# Patient Record
Sex: FEMALE | Race: WHITE | NOT HISPANIC OR LATINO | Employment: FULL TIME | URBAN - METROPOLITAN AREA
[De-identification: names, ages, dates, MRNs, and addresses within clinical notes are randomized per-mention and may not be internally consistent; named-entity substitution may affect disease eponyms.]

---

## 2017-03-31 ENCOUNTER — GENERIC CONVERSION - ENCOUNTER (OUTPATIENT)
Dept: OTHER | Facility: OTHER | Age: 52
End: 2017-03-31

## 2017-03-31 ENCOUNTER — HOSPITAL ENCOUNTER (OUTPATIENT)
Dept: BONE DENSITY | Facility: IMAGING CENTER | Age: 52
Discharge: HOME | End: 2017-03-31
Payer: COMMERCIAL

## 2017-03-31 DIAGNOSIS — Z12.31 ENCOUNTER FOR SCREENING MAMMOGRAM FOR MALIGNANT NEOPLASM OF BREAST: ICD-10-CM

## 2017-03-31 PROCEDURE — G0202 SCR MAMMO BI INCL CAD: HCPCS

## 2017-12-12 ENCOUNTER — ALLSCRIPTS OFFICE VISIT (OUTPATIENT)
Dept: OTHER | Facility: OTHER | Age: 52
End: 2017-12-12

## 2017-12-12 DIAGNOSIS — Z12.31 ENCOUNTER FOR SCREENING MAMMOGRAM FOR MALIGNANT NEOPLASM OF BREAST: ICD-10-CM

## 2017-12-13 NOTE — PROGRESS NOTES
Assessment    1  Encounter for gynecological examination with abnormal finding (V72 31) (Z01 411)   2  Visit for screening mammogram (V76 12) (Z12 31)   3  Dietary calcium deficiency (269 3) (E58)   4  Cervical cancer screening (V76 2) (Z12 4)   5  Colon cancer screening (V76 51) (Z12 11)   6  Enlarged uterus (621 2) (N85 2)   7  Inadequate exercise (V69 0) (Z72 3)    Plan  Cervical cancer screening    · (Q) THINPREP TIS PAP AND HPV mRNA E6/E7 REFLEX HPV 16,18/45; Status:Active; Requested for:69Jhr2142;    Perform:[facility]; Order Comments:53 yo G0  LMP 12/4/17  no hx abnl papcvx/endocvx; Due:52Vxc4200; Ordered; For:Cervical cancer screening; Ordered By:Erma Ernst;  Colon cancer screening    · (Q) FECAL GLOBIN BY IMMUNOCHEMISTRY; Status:Active; Requested for:51Rmx0591;    Perform:[facility]; Due:06Occ4172; Ordered;cancer screening; Ordered By:Julius Ernst;  Encounter for gynecological examination with abnormal finding    · Follow-up visit in 1 year Evaluation and Treatment  Follow-up  Status: Hold For -Scheduling  Requested for: 53BLB8285   Ordered;Encounter for gynecological examination with abnormal finding; Ordered By: Narendra Haley Performed:  Due: 76YWD5576   · Begin or continue regular aerobic exercise   Gradually work up to at least {count1}sessions of {dur1} of exercise a week ; Status:Complete;   Done: 68HTI7852 04:02PM   Ordered;For:Encounter for gynecological examination with abnormal finding; Ordered By:Julius Ernst;   · Drink plenty of fluids ; Status:Complete;   Done: 86SOT9335 04:02PM   Ordered;For:Encounter for gynecological examination with abnormal finding; Ordered By:Julius Ernst;   · Eat a low fat and low cholesterol diet ; Status:Complete;   Done: 13NUL2262 04:02PM   Ordered;For:Encounter for gynecological examination with abnormal finding; Ordered By:Julius Ernst;   · Eat a normal well-balanced diet ; Status:Complete;   Done: 33TSJ9188 04:02PM   Ordered;For:Encounter for gynecological examination with abnormal finding; Ordered By:Dexter Ernst;   · Eat foods that are high in calcium ; Status:Complete;   Done: 44FUN3783 04:02PM   Ordered;For:Encounter for gynecological examination with abnormal finding; Ordered By:Dexter Ernst;   · How to prevent vaginal infections ; Status:Complete;   Done: 80UAV4639 04:02PM   Ordered;for gynecological examination with abnormal finding; Ordered By:Erma Ernst;   · Many types of infections can be passed person to person  To prevent this you need to beisolated for 7 days ; Status:Complete;   Done: 10BUO8060 04:02PM   Ordered;for gynecological examination with abnormal finding; Ordered By:Dexter Ernst;   · Regular aerobic exercise can help reduce stress ; Status:Complete;   Done: 28EVQ312215:79VX   Ordered;for gynecological examination with abnormal finding; Ordered By:Erma Ernst;   · Stretch and warm up your muscles during the first 10 minutes , then cool down yourmuscles for the last 10 minutes of exercise ; Status:Complete;   Done: 40ATW541985:73EJ   Ordered;for gynecological examination with abnormal finding; Ordered By:Dexter Ernst;   · These are things you can do to prevent falls in and around the home ; Status:Complete;  Done: 81AKN2307 04:02PM   Ordered;for gynecological examination with abnormal finding; Ordered By:Erma Ernst;   · Use a sun block product with an SPF of 15 or more ; Status:Complete;   Done:12Rqy4919 04:02PM   Ordered;for gynecological examination with abnormal finding; Ordered By:Erma Ernst;   · We encourage all of our patients to exercise regularly  30 minutes of exercise or physicalactivity five or more days a week is recommended for children and adults  ;Status:Complete;   Done: 69USU6050 95:67FN   Ordered;for gynecological examination with abnormal finding; Ordered By:Erma Ernst;   · We recommend that you change your eating habits slowly ; Status:Complete; Done:12Dec2017 04:02PM   Ordered;for gynecological examination with abnormal finding; Ordered By:Manny Ernst;   · We recommend that you create an advance directive ; Status:Complete;   Done:12Dec2017 04:02PM   Ordered;for gynecological examination with abnormal finding; Ordered By:Erma Ernst;   · We recommend that you follow these steps to lower your risk of osteoporosis  ;Status:Complete;   Done: 85CJK4384 21:51CM   Ordered;for gynecological examination with abnormal finding; Ordered By:Erma Ernst;   · We recommend you modify your diet to achieve and maintain a healthy weight  Patricia Jeremy may increase your risk for developing health problems such as diabetes,heart disease, and cancer  Avoid high fat foods and eat a balanced diet richin fruits and vegetables  The combination of a reduced-calorie diet and increasedphysical activity is recommended  Please let us know if you would like tolearn more about your nutrition and calorie needs, and additional options includingweight loss programs that can help you achieve your goals ; Status:Complete;   Done:12Dec2017 04:02PM   Ordered;for gynecological examination with abnormal finding; Ordered By:Manny Ernst;  Visit for screening mammogram    · MAMMO SCREENING BILATERAL W 3D & CAD; Status:Hold For - Scheduling; Requestedfor:12Dec2017;    Perform:[facility]; Order Comments:51 yo G0 c LMP 12/4/17 needs screening; Due:12Dec2018; Ordered;for screening mammogram; Ordered By:Erma Ernst;    Discussion/Summary  Currently, she eats an adequate diet, has an inadequate exercise regimen and enc increased frequency  Pap test was done today Breast cancer screening: mammogram has been ordered  Colorectal cancer screening: fecal occult blood testing supplies were given, the next colonoscopy is due 2017 and 2014  Osteoporosis screening: bone mineral density testing is not indicated   Advice and education were given regarding aerobic exercise, weight bearing exercise, calcium supplements and reproductive health  Patient discussion: discussed with the patient  As above  The patient has the current Goals: Prevention  The patent has the current Barriers: 0  Patient is able to Self-Care  History of Present Illness  HPI: Pt is a 47 yo G0 c LMP 17 using nothing for [facility] presents for preventive care  same x 17 yrs, lifetime1-2x/dywalks 2x/wkmonthly [id number], due ? now      Review of Systems  no nonmenstrual bleeding,-- no dysuria-- and-- no bladder pain  OB History  Pregnancy History (Brief):  Prior pregnancies: : 0  Para: Active Problems  1  Acne (706 1) (L70 9)   2  Adenomyosis (617 0) (N80 0)   3  Colon cancer screening (V76 51) (Z12 11)   4  Cough (786 2) (R05)   5  Dietary calcium deficiency (269 3) (E58)   6  Encounter for gynecological examination with abnormal finding (V72 31) (Z01 411)   7  Hemorrhoids (455 6) (K64 9)   8  Premenstrual tension syndrome (625 4) (N94 3)   9  Urinary tract infection (599 0) (N39 0)   10  Visit for screening mammogram (V76 12) (Z12 31)    Past Medical History   · History of Chlamydial cervicitis (079 88) (A56 09)   · History of Dermoid cyst of right ovary (220) (D27 0)   · History of Fibroids, intramural (218 1) (D25 1)   · Denied: History of abnormal cervical Pap smear   · History of Hashimoto thyroiditis (V12 29) (Z86 39)   · Denied: History of pregnancy    The active problems and past medical history were reviewed and updated today  Surgical History   · History of Complete Colonoscopy   · History of Hysteroscopy   · History of Oral Surgery Tooth Extraction   · History of Removal Of Lesion   · History of Salpingo-oophorectomy Right Side    The surgical history was reviewed and updated today         Family History  Mother    · Family history of hypercholesterolemia (V18 19) (Z83 42)   · Family history of hypertension (V17 49) (Z82 49)   · Family history of skin cancer (V16 8) (Z80 8)   · Family history of type 2 diabetes mellitus (V18 0) (Z83 3)  Father    · Family history of hypertension (V17 49) (Z82 49)   · Family history of lung cancer (V16 1) (Z80 1)   · Family history of Glaucoma  Sister    · Family history of hypercholesterolemia (V18 19) (Z83 42)   · Family history of hypertension (V17 49) (Z82 49)  Brother    · Family history of lung cancer (V16 1) (Z80 1)  Paternal Aunt    · Family history of breast cancer (V16 3) (Z80 3)   · Family history of leukemia (V16 6) (Z80 6)  Maternal Uncle    · Family history of aneurysm (V19 8) (Z82 49)    The family history was reviewed and updated today  Social History     · Alcohol use (V49 89) (Z78 9)   · 2-4x/month   · Denied: History of Drug use   · Inadequate exercise (V69 0) (Z72 3)   ·    · Never A Smoker   · Occupation   · dental hygienist  The social history was reviewed and updated today  Current Meds   1  Calcium + D TABS; Therapy: (Recorded:25Rbk7609) to Recorded    Allergies  1  Latex Exam Gloves MISC   2  Codeine Derivatives   3  Zithromax TABS  4  Latex    Vitals   Recorded: 27OTQ5822 58:25MF   Systolic 033   Diastolic 68   Height 5 ft 6 in   Weight 168 lb    BMI Calculated 27 12   BSA Calculated 1 86   LMP 73VGW5560       Physical Exam   Constitutional  General appearance: No acute distress, well appearing and well nourished  Neck  Neck: Normal, supple, trachea midline, no masses  Thyroid: Normal, no thyromegaly  Pulmonary  Respiratory effort: No increased work of breathing or signs of respiratory distress  Genitourinary  External genitalia: Normal and no lesions appreciated  Vagina: Normal, no lesions or dryness appreciated  Urethral meatus: Normal    Bladder: Normal, soft, non-tender and no prolapse or masses appreciated  Cervix: Normal, no palpable masses  -- nullip s lesions  Uterus: Abnormal  -- mid plane, 3 cm ant myoma, 4 cm post fundal myoma  10 wks size    Adnexa/parametria: Normal, non-tender and no fullness or masses appreciated  -- no masses or tenderness  Anus, perineum, and rectum: Normal sphincter tone, no masses, and no prolapse  No masses or nodularity  pelvic brim clear  Chest  Breasts: Normal and no dimpling or skin changes noted  Abdomen  Abdomen: Normal, non-tender, and no organomegaly noted  Liver and spleen: No hepatomegaly or splenomegaly  Examination for hernias: No hernias appreciated  Lymphatic  Palpation of lymph nodes in neck, axillae, groin and/or other locations: No lymphadenopathy or masses noted  Skin  Skin and subcutaneous tissue: Normal skin turgor and no rashes     Psychiatric  Orientation to person, place, and time: Normal    Mood and affect: Normal        Signatures   Electronically signed by : GIOVANNI Green ; Dec 12 2017  4:03PM EST                       (Author)

## 2017-12-14 LAB
ADDITIONAL INFORMATION (HISTORICAL): NORMAL
ADEQUACY: (HISTORICAL): NORMAL
COMMENT (HISTORICAL): NORMAL
CYTOTECHNOLOGIST: (HISTORICAL): NORMAL
HPV MRNA E6/E7 (HISTORICAL): NOT DETECTED
INTERPRETATION (HISTORICAL): NORMAL
LMP (HISTORICAL): NORMAL
PREV. BX: (HISTORICAL): NORMAL
PREV. PAP (HISTORICAL): NORMAL
SOURCE (HISTORICAL): NORMAL

## 2017-12-15 ENCOUNTER — GENERIC CONVERSION - ENCOUNTER (OUTPATIENT)
Dept: OTHER | Facility: OTHER | Age: 52
End: 2017-12-15

## 2018-01-13 NOTE — RESULT NOTES
Verified Results  * MAMMO SCREENING BILATERAL W CAD 18SKC0792 07:44AM Larissa Stallings Order Number: [id number]   Performing Comments: 45 yo G0 needs screening  Paternal aunt had Ca  at 80 yo   - Patient Instructions: To schedule this appointment, please contact Central Scheduling at [phone]  Do not wear any perfume, powder, lotion or deodorant on breast or    underarm area  Please bring your doctors order, referral (if needed) and insurance information with you on the day of the test  Failure to bring this information may result in this test being rescheduled  Arrive 15 minutes prior to your appointment time to register  On the day of your test, please bring any prior mammogram or breast studies with you that were not performed at a [facility]  Failure to bring prior exams may result in your test needing to be rescheduled  Test Name Result Flag Reference   MAMMO SCREENING BILATERAL W CAD (Report)     Patient History:   Patient is nulliparous  Family history of breast cancer at age 79 in paternal aunt  Took hormonal contraceptives for 12 years beginning at age 12  Patient has never smoked  Patient's BMI is 26 8  Reason for exam: screening, asymptomatic  Mammo Screening Bilateral W CAD: March 31, 2017 - Check In #:    [de-identified]   Bilateral MLO, CC, and XCCL view(s) were taken  Technologist: MIKE Beyer (R)(M)   Prior study comparison: June 30, 2015, bilateral WB digitl bilat    osorio, performed at [address] [place] [address]  November 16, 2009, bilateral mammogram  October 31, 2007, bilateral    mammogram      There are scattered fibroglandular densities  No dominant soft tissue mass, architectural distortion or    suspicious calcifications are noted  The skin and nipple    contours are within normal limits  No evidence of malignancy  No significant changes   when compared with prior studies       ACR BI-RADSï¾® Assessments: BiRad:1 - Negative     Recommendation:   Routine screening mammogram of both breasts in 1 year  A    reminder letter will be sent  Analyzed by CAD     8-10% of cancers will be missed on mammography  Management of a    palpable abnormality must be based on clinical grounds  Patients   will be notified of their results via letter from our facility  Accredited by [employer] of Radiology and FDA  Transcription Location: MIKE Garner 98: [id number]     Risk Value(s):   Tyrer-Cuzick 10 Year: 4 200%, Tyrer-Cuzick Lifetime: 15 900%,    Myriad Table: 1 5%, IRIS 5 Year: 1 2%, NCI Lifetime: 9 6%   Signed by:    Thuy Ruelas MD   3/31/17

## 2018-01-22 VITALS
HEIGHT: 66 IN | SYSTOLIC BLOOD PRESSURE: 104 MMHG | DIASTOLIC BLOOD PRESSURE: 68 MMHG | BODY MASS INDEX: 27 KG/M2 | WEIGHT: 168 LBS

## 2018-01-23 NOTE — RESULT NOTES
Verified Results  (Q) THINPREP TIS PAP AND HPV mRNA E6/E7 REFLEX HPV 16,18/45 [id number] 12:00AM Wade Saunders     Test Name Result Flag Reference   CLINICAL INFORMATION: G0     LMP: 730790     PREV  PAP: NO HX ABNL     PREV  BX: NONE GIVEN     SOURCE: Cervix, Endocervix     STATEMENT OF ADEQUACY:      Satisfactory for evaluation  Endocervical/transformation zone component  present  INTERPRETATION/RESULT:      Negative for intraepithelial lesion or malignancy  COMMENT:      This Pap test has been evaluated with computer  assisted technology  CYTOTECHNOLOGIST:      ALFREDO RICHARDSON(ASCP)  CT screening location: [address] [place] [address]   HPV mRNA E6/E7 Not Detected  Not Detected   This test was performed using the APTIMA HPV Assay ([employer] )  This assay detects E6/E7 viral messenger RNA (mRNA) from 14  high-risk HPV types (16,18,31,33,35,39,45,51,52,56,58,59,66,68)

## 2019-02-22 LAB
ALBUMIN SERPL-MCNC: 3.9 G/DL (ref 3.6–5.1)
ALBUMIN/GLOB SERPL: 1.6 (CALC) (ref 1–2.5)
ALP SERPL-CCNC: 51 U/L (ref 33–130)
ALT SERPL-CCNC: 9 U/L (ref 6–29)
AST SERPL-CCNC: 15 U/L (ref 10–35)
BILIRUB SERPL-MCNC: 0.5 MG/DL (ref 0.2–1.2)
BUN SERPL-MCNC: 16 MG/DL (ref 7–25)
BUN/CREAT SERPL: NORMAL (CALC) (ref 6–22)
CALCIUM SERPL-MCNC: 9.1 MG/DL (ref 8.6–10.4)
CHLORIDE SERPL-SCNC: 106 MMOL/L (ref 98–110)
CO2 SERPL-SCNC: 26 MMOL/L (ref 20–32)
CREAT SERPL-MCNC: 0.76 MG/DL (ref 0.5–1.05)
GLOBULIN SER CALC-MCNC: 2.4 G/DL (CALC) (ref 1.9–3.7)
GLUCOSE SERPL-MCNC: 90 MG/DL (ref 65–139)
POTASSIUM SERPL-SCNC: 3.9 MMOL/L (ref 3.5–5.3)
PROT SERPL-MCNC: 6.3 G/DL (ref 6.1–8.1)
SL AMB EGFR AFRICAN AMERICAN: 104 ML/MIN/1.73M2
SL AMB EGFR NON AFRICAN AMERICAN: 90 ML/MIN/1.73M2
SODIUM SERPL-SCNC: 138 MMOL/L (ref 135–146)
T4 FREE SERPL-MCNC: 1 NG/DL (ref 0.8–1.8)
TSH SERPL-ACNC: 5.66 MIU/L

## 2020-10-07 ENCOUNTER — TRANSCRIBE ORDERS (OUTPATIENT)
Dept: ADMINISTRATIVE | Facility: HOSPITAL | Age: 55
End: 2020-10-07

## 2020-10-07 DIAGNOSIS — Z12.31 ENCOUNTER FOR SCREENING MAMMOGRAM FOR MALIGNANT NEOPLASM OF BREAST: Primary | ICD-10-CM

## 2020-11-20 ENCOUNTER — TELEPHONE (OUTPATIENT)
Dept: OBGYN CLINIC | Facility: CLINIC | Age: 55
End: 2020-11-20

## 2020-12-17 ENCOUNTER — HOSPITAL ENCOUNTER (OUTPATIENT)
Dept: BONE DENSITY | Facility: IMAGING CENTER | Age: 55
Discharge: HOME | End: 2020-12-17
Payer: COMMERCIAL

## 2020-12-17 VITALS — BODY MASS INDEX: 29.99 KG/M2 | HEIGHT: 65 IN | WEIGHT: 180 LBS

## 2020-12-17 DIAGNOSIS — Z12.31 ENCOUNTER FOR SCREENING MAMMOGRAM FOR MALIGNANT NEOPLASM OF BREAST: ICD-10-CM

## 2020-12-17 PROCEDURE — 77067 SCR MAMMO BI INCL CAD: CPT

## 2020-12-17 PROCEDURE — 77063 BREAST TOMOSYNTHESIS BI: CPT

## 2021-12-20 ENCOUNTER — HOSPITAL ENCOUNTER (OUTPATIENT)
Dept: MAMMOGRAPHY | Facility: MEDICAL CENTER | Age: 56
Discharge: HOME | End: 2021-12-20
Payer: COMMERCIAL

## 2021-12-20 VITALS — BODY MASS INDEX: 29.97 KG/M2 | HEIGHT: 65 IN | WEIGHT: 179.9 LBS

## 2021-12-20 DIAGNOSIS — Z12.31 ENCOUNTER FOR SCREENING MAMMOGRAM FOR MALIGNANT NEOPLASM OF BREAST: ICD-10-CM

## 2021-12-20 PROCEDURE — 77063 BREAST TOMOSYNTHESIS BI: CPT

## 2021-12-20 PROCEDURE — 77067 SCR MAMMO BI INCL CAD: CPT

## 2022-01-21 ENCOUNTER — HOSPITAL ENCOUNTER (OUTPATIENT)
Dept: MAMMOGRAPHY | Facility: CLINIC | Age: 57
Discharge: HOME | End: 2022-01-21
Payer: COMMERCIAL

## 2022-01-21 ENCOUNTER — HOSPITAL ENCOUNTER (OUTPATIENT)
Dept: ULTRASOUND IMAGING | Facility: CLINIC | Age: 57
Discharge: HOME | End: 2022-01-21
Payer: COMMERCIAL

## 2022-01-21 VITALS — WEIGHT: 179 LBS | BODY MASS INDEX: 29.82 KG/M2 | HEIGHT: 65 IN

## 2022-01-21 DIAGNOSIS — R92.8 ABNORMAL SCREENING MAMMOGRAM: ICD-10-CM

## 2022-01-21 PROCEDURE — 76642 ULTRASOUND BREAST LIMITED: CPT

## 2022-01-21 PROCEDURE — G0279 TOMOSYNTHESIS, MAMMO: HCPCS

## 2022-01-21 PROCEDURE — 77065 DX MAMMO INCL CAD UNI: CPT

## 2022-06-24 ENCOUNTER — ANESTHESIA (OUTPATIENT)
Dept: GASTROENTEROLOGY | Facility: HOSPITAL | Age: 57
End: 2022-06-24

## 2022-06-24 ENCOUNTER — HOSPITAL ENCOUNTER (OUTPATIENT)
Dept: GASTROENTEROLOGY | Facility: HOSPITAL | Age: 57
Setting detail: OUTPATIENT SURGERY
Discharge: HOME | End: 2022-06-24
Attending: COLON & RECTAL SURGERY | Admitting: COLON & RECTAL SURGERY
Payer: COMMERCIAL

## 2022-06-24 ENCOUNTER — ANESTHESIA EVENT (OUTPATIENT)
Dept: GASTROENTEROLOGY | Facility: HOSPITAL | Age: 57
End: 2022-06-24

## 2022-06-24 VITALS
RESPIRATION RATE: 18 BRPM | TEMPERATURE: 98.6 F | BODY MASS INDEX: 30.66 KG/M2 | HEART RATE: 54 BPM | WEIGHT: 184 LBS | DIASTOLIC BLOOD PRESSURE: 76 MMHG | SYSTOLIC BLOOD PRESSURE: 106 MMHG | HEIGHT: 65 IN | OXYGEN SATURATION: 97 %

## 2022-06-24 DIAGNOSIS — Z12.11 ENCOUNTER FOR SCREENING FOR MALIGNANT NEOPLASM OF COLON: ICD-10-CM

## 2022-06-24 DIAGNOSIS — Z80.0 FAMILY HISTORY OF MALIGNANT NEOPLASM OF DIGESTIVE ORGANS: ICD-10-CM

## 2022-06-24 DIAGNOSIS — Z86.010 PERSONAL HISTORY OF COLONIC POLYPS: ICD-10-CM

## 2022-06-24 PROBLEM — R11.2 PONV (POSTOPERATIVE NAUSEA AND VOMITING): Status: ACTIVE | Noted: 2022-06-24

## 2022-06-24 PROBLEM — Z98.890 PONV (POSTOPERATIVE NAUSEA AND VOMITING): Status: ACTIVE | Noted: 2022-06-24

## 2022-06-24 PROCEDURE — 88305 TISSUE EXAM BY PATHOLOGIST: CPT | Performed by: PATHOLOGY

## 2022-06-24 RX ORDER — SODIUM CHLORIDE 9 MG/ML
125 INJECTION, SOLUTION INTRAVENOUS CONTINUOUS
Status: DISCONTINUED | OUTPATIENT
Start: 2022-06-24 | End: 2022-06-28 | Stop reason: HOSPADM

## 2022-06-24 RX ORDER — PROPOFOL 10 MG/ML
INJECTION, EMULSION INTRAVENOUS CONTINUOUS PRN
Status: DISCONTINUED | OUTPATIENT
Start: 2022-06-24 | End: 2022-06-24

## 2022-06-24 RX ORDER — PROPOFOL 10 MG/ML
INJECTION, EMULSION INTRAVENOUS AS NEEDED
Status: DISCONTINUED | OUTPATIENT
Start: 2022-06-24 | End: 2022-06-24

## 2022-06-24 RX ADMIN — PROPOFOL 140 MCG/KG/MIN: 10 INJECTION, EMULSION INTRAVENOUS at 09:34

## 2022-06-24 RX ADMIN — PROPOFOL 50 MG: 10 INJECTION, EMULSION INTRAVENOUS at 09:39

## 2022-06-24 RX ADMIN — SODIUM CHLORIDE 125 ML/HR: 0.9 INJECTION, SOLUTION INTRAVENOUS at 08:30

## 2022-06-24 RX ADMIN — PROPOFOL 100 MG: 10 INJECTION, EMULSION INTRAVENOUS at 09:34

## 2022-06-24 NOTE — DISCHARGE INSTRUCTIONS
[facility] [address], [address] [place] [address] [address]  Phone: [phone]    DISCHARGE INSTRUCTIONS:    1   ___ Complete Exam - Normal    2   ___ Exam normal, but entire colon not seen  We will discuss this with you  3   ___ Polyp(s) removed by "burning" - NO pathology report will follow    4   _5__ Polyp(s) removed by excision  Pathology report will be available in 4-5 days   Someone from our office will call you with results  5   ___ Exam prompted biopsies  Pathology report will be available in 4-5 days   Someone from our office will call you with results  6   ___ Exam demonstrated findings that need treatment  Prescriptions will be   Given to you  Return to our office in ____ weeks  Please call for appt  7   ___ Original office visit or colonoscopy findings necessitate an office visit  Please call to set up a new appointment    8   ___ Medication  __________________________________________        [address]:    - Go straight home and rest today    - No driving or drinking alcohol for 24 hours    - Resume regular diet and medications unless otherwise instructed  Coumadin and Plavix are blood thinners  You can resume these medications on __________      IF YOU ARE HAVING ANY FEVER, BLEEDING OR PERSISTENT PAIN IN THE ABDOMEN, PLEASE CALL OUR OFFICE ANY DAY OR TIME  [phone]

## 2022-06-24 NOTE — ANESTHESIA PREPROCEDURE EVALUATION
Procedure:  COLONOSCOPY    Relevant Problems   ANESTHESIA   (+) PONV (postoperative nausea and vomiting)        Physical Exam    Airway    Mallampati score: II  TM Distance: >3 FB  Neck ROM: full     Dental   No notable dental hx     Cardiovascular  Cardiovascular exam normal    Pulmonary  Pulmonary exam normal     Other Findings        Anesthesia Plan  ASA Score- 2     Anesthesia Type- IV sedation with anesthesia with ASA Monitors  Additional Monitors:   Airway Plan:     Comment: No recent labs  Plan Factors-Exercise tolerance (METS): >4 METS  Chart reviewed  Patient is not a current smoker  Patient not instructed to abstain from smoking on day of procedure  Patient did not smoke on day of surgery  Induction- intravenous  Postoperative Plan-     Informed Consent- Anesthetic plan and risks discussed with patient  I personally reviewed this patient with the CRNA  Discussed and agreed on the Anesthesia Plan with the CRNA  Zain Delarosa

## 2022-06-24 NOTE — INTERVAL H&P NOTE
H&P reviewed  After examining the patient I find no changes in the patients condition since the H&P had been written      Vitals:    06/24/22 0833   BP: 122/77   Pulse: 61   Resp: 20   Temp: (!) 97 4 °F (36 3 °C)   SpO2: 98%

## 2022-06-24 NOTE — ANESTHESIA POSTPROCEDURE EVALUATION
Post-Op Assessment Note    CV Status:  Stable    Pain management: adequate     Mental Status:  Alert and awake   Hydration Status:  Euvolemic   PONV Controlled:  Controlled   Airway Patency:  Patent      Post Op Vitals Reviewed: Yes      Staff: CRNA         No complications documented      BP   106/76   Temp 98 6   Pulse  53   Resp  18   SpO2   97

## 2023-03-30 ENCOUNTER — HOSPITAL ENCOUNTER (OUTPATIENT)
Dept: RADIOLOGY | Facility: IMAGING CENTER | Age: 58
End: 2023-03-30

## 2023-03-30 VITALS — HEIGHT: 65 IN | BODY MASS INDEX: 31.65 KG/M2 | WEIGHT: 190 LBS

## 2023-03-30 DIAGNOSIS — Z12.31 ENCOUNTER FOR SCREENING MAMMOGRAM FOR MALIGNANT NEOPLASM OF BREAST: ICD-10-CM

## 2023-05-10 ENCOUNTER — HOSPITAL ENCOUNTER (OUTPATIENT)
Facility: MEDICAL CENTER | Age: 58
Discharge: HOME | End: 2023-05-10

## 2023-05-10 DIAGNOSIS — H93.12 LEFT-SIDED TINNITUS: ICD-10-CM

## 2023-05-10 DIAGNOSIS — H90.42 SENSORINEURAL HEARING LOSS (SNHL) OF LEFT EAR WITH UNRESTRICTED HEARING OF RIGHT EAR: ICD-10-CM

## 2023-05-12 ENCOUNTER — HOSPITAL ENCOUNTER (OUTPATIENT)
Dept: MRI IMAGING | Facility: HOSPITAL | Age: 58
Discharge: HOME | End: 2023-05-12

## 2023-05-12 RX ADMIN — GADOBUTROL 8 ML: 604.72 INJECTION INTRAVENOUS at 08:13

## 2023-08-07 ENCOUNTER — CONSULT (OUTPATIENT)
Dept: NEUROLOGY | Facility: CLINIC | Age: 58
End: 2023-08-07
Payer: COMMERCIAL

## 2023-08-07 VITALS
WEIGHT: 192 LBS | DIASTOLIC BLOOD PRESSURE: 91 MMHG | BODY MASS INDEX: 32.78 KG/M2 | HEIGHT: 64 IN | TEMPERATURE: 97.7 F | SYSTOLIC BLOOD PRESSURE: 138 MMHG | HEART RATE: 65 BPM

## 2023-08-07 DIAGNOSIS — R20.2 FACIAL TINGLING: ICD-10-CM

## 2023-08-07 DIAGNOSIS — G43.809 OTHER MIGRAINE WITHOUT STATUS MIGRAINOSUS, NOT INTRACTABLE: ICD-10-CM

## 2023-08-07 DIAGNOSIS — H93.12 TINNITUS OF LEFT EAR: ICD-10-CM

## 2023-08-07 DIAGNOSIS — G43.109 MIGRAINE WITH AURA AND WITHOUT STATUS MIGRAINOSUS, NOT INTRACTABLE: Primary | ICD-10-CM

## 2023-08-07 PROCEDURE — 99204 OFFICE O/P NEW MOD 45 MIN: CPT | Performed by: PHYSICIAN ASSISTANT

## 2023-08-07 NOTE — PROGRESS NOTES
Patient ID: Godfrey Major is a 62 y.o. female. Assessment/Plan:       Diagnoses and all orders for this visit:    Migraine with aura and without status migrainosus, not intractable  -     Comprehensive metabolic panel; Future  -     CBC and differential; Future  -     TSH, 3rd generation with Free T4 reflex; Future  -     Vitamin D 25 hydroxy; Future  -     HEMOGLOBIN A1C W/ EAG ESTIMATION; Future  -     Lipid panel; Future  -     Vitamin B12; Future  -     Lyme Total AB W Reflex to IGM/IGG; Future    Other migraine without status migrainosus, not intractable  -     Ambulatory referral to Neurology    Tinnitus of left ear    Facial tingling       The patient is here for worsening headaches associated with mild left facial tingling V1 and V2 region and around the eye, left sided tinnitus for which she was diagnosed with sensorineural hearing loss, not pulsatile tinnitus, no facial droop, no weakness of the extremities or other focal deficits. We discussed treatments and options for migraine headaches but first she would like to work up the condition. Brain MRI is normal thankfully. MRA as noted below and labs ordered today to rule out reversible causes. If nothing is discovered that is secondarily causing her symptoms, she may benefit from starting a low-dose tricyclic, or other supplements. Will opt to order MRA head to r/o aneurysm or other vascular malformation contributing to facial numbness, headaches and tinnitus; however higher suspicion tinnitus is 2/2 sensorineural hearing loss per audiology testing. Will move fwd with MRA anyway to rule it out. We discussed keeping a journal to identify any other triggers or patterns for her headaches were possible. We discussed food triggers, other supplements that she can take for prevention, lifestyle changes/modifications. The patient should not hesitate to call me prior to her follow up with any questions or concerns.         Subjective:    HPI Ms. Kristopher Stark is a pleasant 61 yo RH female here for neurological consultation for headaches and left sided tinnitus. She is a dental hygienist.    Started with ringing in her left ear, has flashing, floaters and specking in her left eye. Hit her eye many years ago and had been seen prev for that. Noticed after 2019 getting her covid shot she had a headache for 3 days, and since then has had headahces on her left side with tingling on her left side of her face and floaters. The patient has a history of a minor trauma to the corner of the right eye region, not the eye itself. She was in a dark room working in an office, an x-ray room which was dark and she lowered her face to look at something and hit the side of the left eye on the processor. She did not damage the orbit or cornea, but since that injury she reports floaters and occasional flashes in that eye. She saw [facility] eye when she was in her 45s and she was told she had ocular migraines. She states she initially went for pupil size changes during the pseudoephedrine treatment that she had for an allergy. She went on to have an MRA and MRI and both were unremarkable per her report. She reports a lot of headaches after her COVID-vaccine and then more so after she had COVID itself. She also reports menopause and stress are triggers for her headaches. COVID-vaccine in 2019, infection with COVID in 2022. She developed tinnitus in November 2022, and then developed a strange white light and a S shaped or squiggle shaped pattern in the left eye and more floaters which were more pronounced right before she saw her doctor for the tinnitus. She states the tinnitus would wake her up in the middle of the night, not pulsatile but more of a vibration and high-pitched sensation. Current headaches are associated with a funny tingling like sensation over the left frontal region, around the eye and left cheek region.   This tingling sensation started in March or April. She also reports of bilateral hand tingling which she wakes up with on and off for the past few months. She sees a chiropractor for neck and head adjustments, uses an ice pack which she gets a migraine. Emesis is rare but she has thrown up with a migraine which has made her feel better in the past.      Migraines/ headaches: Onset: 2020, however pt states in her 30s she saw will eye for "anisocoria" with headaches and had imaging at that time-- was told she has ?ocular migraine  Head injury: As noted above    Current pain: 2/10-- dull ache  Reaches pain level at worst: 10/10  Frequency: 2 per month  Duration: severe for a few hours, dull ache fairly constantly  Location: left frontal/ around left eye  Quality: Pressure, achy, dull, deep  Not worse with coughing, sneezing, etc.  Associated with: nausea, vomiting, insomnia, eye tearing, concentration problems, nasal congestion, flushing, tinnitus, left facial tingling/ cheek tingling, photophobia  Patient states she used to have change in pupil size but not recently (anisocoria dx per eye dr Diana Gil)    Triggers: stress, weather changes, menopause?, prior covid shot, prior covid infection 11/2022  Aura/ warning: Flashing from the eye as noted above, not necessarily a warning sign however    Medications tried:  Prevention-  B-complex vitamin  saligesic  Magnesium    Abortive-  otc    Other non-medication therapies or treatments- chiropractor, ice packs, massage    Family hx of migraines: none  Family hx of cerebral aneurysms: none    Brain MRI with attention to the IACs with and without contrast 5/12/2023 is unremarkable. The following portions of the patient's history were reviewed and updated as appropriate:   She  has a past medical history of Colon polyp.   She   Patient Active Problem List    Diagnosis Date Noted   • Migraine with aura and without status migrainosus, not intractable 08/07/2023   • Tinnitus of left ear 08/07/2023   • Facial tingling 08/07/2023   • PONV (postoperative nausea and vomiting) 06/24/2022     She  has a past surgical history that includes Oophorectomy (Right, 2007). Her family history includes Alcohol abuse in her brother and maternal grandfather; Asthma in her maternal grandmother and sister; Brain cancer in her brother; Breast cancer (age of onset: 68) in her paternal aunt; Colon cancer in her paternal grandmother; Coronary artery disease in her maternal grandmother; Diabetes in her mother; Heart disease in her father; Hyperlipidemia in her brother, mother, and sister; Hypertension in her father, mother, and sister; Hypertension (age of onset: 48) in her brother; Lung cancer (age of onset: 48) in her brother; Lung cancer (age of onset: 79) in her father; Lymphoma in her brother; Migraines in her mother; No Known Problems in her paternal aunt, paternal aunt, paternal aunt, paternal aunt, paternal aunt, paternal grandfather, and sister; Prostate cancer in her maternal grandfather; Skin cancer in her mother. She  reports that she has never smoked. She has never used smokeless tobacco. She reports that she does not use drugs. No history on file for alcohol use. Current Outpatient Medications   Medication Sig Dispense Refill   • ketoconazole (NIZORAL) 2 % shampoo USE SHAMPOO 2 TO 3 TIMES A WEEK FOR ACTIVE RASH. APPLY TO SCALP A...  (REFER TO PRESCRIPTION NOTES). • calcium citrate-vitamin D (CITRACAL+D) 315-200 MG-UNIT per tablet Take by mouth       No current facility-administered medications for this visit. She is allergic to codeine, erythromycin, and latex. .         Objective:    Blood pressure 138/91, pulse 65, temperature 97.7 °F (36.5 °C), temperature source Temporal, height 5' 4" (1.626 m), weight 87.1 kg (192 lb). Body mass index is 32.96 kg/m². Physical Exam    Neurological Exam  Vital signs reviewed. Well developed, well nourished. Mood and affect are pleasant.   Speech is fluent and articulate. Head: Normocephalic, atraumatic  Neck: Neck flexors 5/5, No TTP  CN 2-12: intact and symmetric, including EOMs which are normal b/l and PERRL, except left V1 and V2 distribution slightly reduced to LT and temp compared to R. No facial droop. No ptosis. Pupils equal as noted above. MSK: 5/5 t/o. ROM normal x all 4 extr. No pronator drift. Sensation: Inact to LT x4 extr. Romberg negative. Reflexes: 2+ and symmetric in all 4 extr. Coordination: Nml x4 extr. Gait: Steady normal gait, tandem gait is steady. ROS:    Review of Systems   Constitutional: Negative for appetite change, fatigue and fever. HENT: Negative. Negative for hearing loss, tinnitus, trouble swallowing and voice change. Eyes: Positive for visual disturbance. Negative for photophobia and pain. Respiratory: Negative. Negative for shortness of breath. Cardiovascular: Negative. Negative for palpitations. Gastrointestinal: Negative. Negative for nausea and vomiting. Endocrine: Negative. Negative for cold intolerance. Genitourinary: Negative. Negative for dysuria, frequency and urgency. Musculoskeletal: Negative for back pain, gait problem, myalgias and neck pain. Skin: Negative. Negative for rash. Allergic/Immunologic: Negative. Neurological: Positive for numbness and headaches. Negative for dizziness, tremors, seizures, syncope, facial asymmetry, speech difficulty, weakness and light-headedness. Hematological: Negative. Does not bruise/bleed easily. Psychiatric/Behavioral: Negative. Negative for confusion, hallucinations and sleep disturbance. Ros reviewed.   I have spent a total time of 45 minutes on 08/09/23 in caring for this patient including Diagnostic results, Risks and benefits of tx options, Instructions for management, Patient and family education, Importance of tx compliance, Impressions, Counseling / Coordination of care, Documenting in the medical record, Reviewing / ordering tests, medicine, procedures  , Obtaining or reviewing history   and Communicating with other healthcare professionals .

## 2023-08-08 LAB
25(OH)D3 SERPL-MCNC: 49 NG/ML (ref 30–100)
B BURGDOR AB SER IA-ACNC: <0.9 INDEX
BASOPHILS # BLD AUTO: 58 CELLS/UL (ref 0–200)
BASOPHILS NFR BLD AUTO: 1.1 %
CHOLEST SERPL-MCNC: 252 MG/DL
CHOLEST/HDLC SERPL: 3.5 (CALC)
EOSINOPHIL # BLD AUTO: 403 CELLS/UL (ref 15–500)
EOSINOPHIL NFR BLD AUTO: 7.6 %
ERYTHROCYTE [DISTWIDTH] IN BLOOD BY AUTOMATED COUNT: 13 % (ref 11–15)
EST. AVERAGE GLUCOSE BLD GHB EST-MCNC: 105 MG/DL
EST. AVERAGE GLUCOSE BLD GHB EST-SCNC: 5.8 MMOL/L
HBA1C MFR BLD: 5.3 % OF TOTAL HGB
HCT VFR BLD AUTO: 41.1 % (ref 35–45)
HDLC SERPL-MCNC: 72 MG/DL
HGB BLD-MCNC: 13.8 G/DL (ref 11.7–15.5)
LDLC SERPL CALC-MCNC: 157 MG/DL (CALC)
LYMPHOCYTES # BLD AUTO: 2035 CELLS/UL (ref 850–3900)
LYMPHOCYTES NFR BLD AUTO: 38.4 %
MCH RBC QN AUTO: 30.3 PG (ref 27–33)
MCHC RBC AUTO-ENTMCNC: 33.6 G/DL (ref 32–36)
MCV RBC AUTO: 90.3 FL (ref 80–100)
MONOCYTES # BLD AUTO: 535 CELLS/UL (ref 200–950)
MONOCYTES NFR BLD AUTO: 10.1 %
NEUTROPHILS # BLD AUTO: 2268 CELLS/UL (ref 1500–7800)
NEUTROPHILS NFR BLD AUTO: 42.8 %
NONHDLC SERPL-MCNC: 180 MG/DL (CALC)
PLATELET # BLD AUTO: 259 THOUSAND/UL (ref 140–400)
PMV BLD REES-ECKER: 9.5 FL (ref 7.5–12.5)
RBC # BLD AUTO: 4.55 MILLION/UL (ref 3.8–5.1)
T4 FREE SERPL-MCNC: 0.8 NG/DL (ref 0.8–1.8)
TRIGL SERPL-MCNC: 115 MG/DL
TSH SERPL-ACNC: 20.87 MIU/L (ref 0.4–4.5)
VIT B12 SERPL-MCNC: 587 PG/ML (ref 200–1100)
WBC # BLD AUTO: 5.3 THOUSAND/UL (ref 3.8–10.8)

## 2023-08-09 NOTE — PATIENT INSTRUCTIONS
Headache management instructions  - When patient has a moderate to severe headache, they should seek rest, initiate relaxation and apply cold compresses to the head. - Maintain regular sleep schedule. Adults need at least 7-8 hours of uninterrupted a night. - Limit over the counter medications such as Tylenol, Ibuprofen, Aleve, Excedrin. (No more than 2- 3 times a week or max 10 a month). - Maintain headache diary. Free LENARD for a smart phone, which can be used is "Migraine natalie"  - Limit caffeine to 1-2 cups 8 to 16 oz a day or less. - Avoid dietary trigger. (aged cheese, peanuts, MSG, aspartame and nitrates). - Patient is to have regular frequent meals to prevent headache onset. - Please drink at least 64 ounces of water a day to help remain hydrated.

## 2023-08-15 ENCOUNTER — HOSPITAL ENCOUNTER (OUTPATIENT)
Dept: RADIOLOGY | Facility: IMAGING CENTER | Age: 58
Discharge: HOME | End: 2023-08-15
Payer: COMMERCIAL

## 2023-08-15 DIAGNOSIS — R20.2 FACIAL TINGLING: ICD-10-CM

## 2023-08-15 DIAGNOSIS — G43.109 MIGRAINE WITH AURA AND WITHOUT STATUS MIGRAINOSUS, NOT INTRACTABLE: ICD-10-CM

## 2023-08-15 DIAGNOSIS — H93.12 TINNITUS OF LEFT EAR: ICD-10-CM

## 2023-08-15 PROCEDURE — G1004 CDSM NDSC: HCPCS

## 2023-08-15 PROCEDURE — 70544 MR ANGIOGRAPHY HEAD W/O DYE: CPT

## 2023-08-16 ENCOUNTER — TELEPHONE (OUTPATIENT)
Dept: NEUROLOGY | Facility: CLINIC | Age: 58
End: 2023-08-16

## 2023-08-16 NOTE — TELEPHONE ENCOUNTER
Called [phone] and left a detailed message on pt's answering machine of the below and for a call back if any questions or concerns.

## 2023-08-18 ENCOUNTER — TELEPHONE (OUTPATIENT)
Dept: NEUROLOGY | Facility: CLINIC | Age: 58
End: 2023-08-18

## 2023-08-18 DIAGNOSIS — I67.1 CEREBRAL ANEURYSM: Primary | ICD-10-CM

## 2023-08-18 NOTE — TELEPHONE ENCOUNTER
[facility]'s radiology called and stated that there was significant findings in patient's MRI.

## 2023-08-21 NOTE — TELEPHONE ENCOUNTER
LVM for pt with results and to c/b when she can. Would like to get CTA. Benign and incidental finding likely.

## 2023-08-23 NOTE — TELEPHONE ENCOUNTER
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  Patient agreeable to the CTA head. Patient was encouraged to follow-up with her family doctor regarding elevated TSH. Clerical, can you please assist with scheduling CTA?   Also can you please fax labs to her PCP Dr. Emma Ugalde?-- F# [phone]

## 2023-08-23 NOTE — TELEPHONE ENCOUNTER
8/23/23 at 10:42 am    Hey, this is [employer]. Just returning the call from [facility] just um, hoping to hear from her today. I had an issue yesterday we didn't get to connect just due to scheduling, but um, if she would please give me a call [phone]. Thank you.     See below-NAVIN already spoke to this pt    [address] see Cristal's message    thanks

## 2023-08-23 NOTE — TELEPHONE ENCOUNTER
Called patient to schedule CTA of head W WO, while on the phone with Central [facility] it was noted that the contrast on the order was PO and needs to be IV contrast, unable to schedule test at this time. Please change order to IV Contast.  Once order is updated patient can be contacted to schedule test.  Patient was also inquiring regarding the contrast if it is iodine based and if she is able to have contrast based on her thyroid issues. Patient can be contacted at [phone].

## 2023-08-24 NOTE — TELEPHONE ENCOUNTER
Contacted patient to schedule CTA head w wo contrast, patient stated that she did not want to schedule at this time as she wanted to speak with her  and pcp regarding receiving the contrast as she is very reluctant to have the contast based on her thyroid. Patient is awaiting a call back from her pcp. Advised patient that when she is ready to schedule the test, if she needed assistance, to please contact the office and we can assist with her scheduling or I have placed a copy of the order in the mail to patient and the number to [facility] is on there if she would like to self schedule the test.  Patient stated that she did also want to check with her insurance company prior to the study to make sure it would be a covered service.

## 2023-08-24 NOTE — TELEPHONE ENCOUNTER
I reordered CTA with IV contrast.  I have not heard or read that this contrast will effect the thyroid. She will be ok with it once, drink lots of water after.

## 2023-10-13 ENCOUNTER — HOSPITAL ENCOUNTER (OUTPATIENT)
Dept: CT IMAGING | Facility: HOSPITAL | Age: 58
End: 2023-10-13
Payer: COMMERCIAL

## 2023-10-13 DIAGNOSIS — I67.1 CEREBRAL ANEURYSM: ICD-10-CM

## 2023-10-13 PROCEDURE — G1004 CDSM NDSC: HCPCS

## 2023-10-13 PROCEDURE — 70496 CT ANGIOGRAPHY HEAD: CPT

## 2023-10-13 RX ADMIN — IOHEXOL 80 ML: 350 INJECTION, SOLUTION INTRAVENOUS at 08:34

## 2023-11-02 ENCOUNTER — TELEPHONE (OUTPATIENT)
Dept: NEUROLOGY | Facility: CLINIC | Age: 58
End: 2023-11-02

## 2023-11-02 NOTE — TELEPHONE ENCOUNTER
Called and advised pt of the below. She verbalized clear understanding. Pt wanted to let MK know that she has been having some headache. Mostly sinuses and cough. She is going back to see her ENT. This is just fyi per pt.    ----- Message from Camilla Ly PA-C sent at 10/20/2023  8:27 AM EDT -----  Please let her know the results- prior MRA finding of small possible aneurysm was error/ artifact. CTA head looks normal. Thanks!

## 2024-01-18 PROCEDURE — 87070 CULTURE OTHR SPECIMN AEROBIC: CPT | Performed by: PHYSICIAN ASSISTANT

## 2024-01-18 PROCEDURE — 87205 SMEAR GRAM STAIN: CPT | Performed by: PHYSICIAN ASSISTANT

## 2024-01-31 ENCOUNTER — TELEPHONE (OUTPATIENT)
Dept: NEUROLOGY | Facility: CLINIC | Age: 59
End: 2024-01-31

## 2024-02-09 ENCOUNTER — OFFICE VISIT (OUTPATIENT)
Dept: NEUROLOGY | Facility: CLINIC | Age: 59
End: 2024-02-09
Payer: COMMERCIAL

## 2024-02-09 VITALS
DIASTOLIC BLOOD PRESSURE: 70 MMHG | HEART RATE: 86 BPM | WEIGHT: 185.3 LBS | OXYGEN SATURATION: 97 % | SYSTOLIC BLOOD PRESSURE: 110 MMHG | BODY MASS INDEX: 31.63 KG/M2 | HEIGHT: 64 IN | TEMPERATURE: 96.5 F

## 2024-02-09 DIAGNOSIS — G47.9 SLEEP DIFFICULTIES: ICD-10-CM

## 2024-02-09 DIAGNOSIS — E66.9 OBESITY (BMI 30-39.9): ICD-10-CM

## 2024-02-09 DIAGNOSIS — G43.109 MIGRAINE WITH AURA AND WITHOUT STATUS MIGRAINOSUS, NOT INTRACTABLE: Primary | ICD-10-CM

## 2024-02-09 DIAGNOSIS — R20.2 FACIAL TINGLING: ICD-10-CM

## 2024-02-09 PROCEDURE — 99215 OFFICE O/P EST HI 40 MIN: CPT | Performed by: STUDENT IN AN ORGANIZED HEALTH CARE EDUCATION/TRAINING PROGRAM

## 2024-02-09 NOTE — PROGRESS NOTES
[facility] Consult  PATIENT:  Aziza Armstrong  MRN:  [id number]  :  1965  DATE OF SERVICE:  2024  REFERRED BY: No ref. provider found  PMD: Anish Price DO    Assessment/Plan:     Aziza Armstrong is a very pleasant 58 y.o. female with a past medical history that includes migraines, obesity referred here for evaluation of headache.    Initial evaluation 2024     Ms. Armstrong reports a history of headaches that started approximately 20 years ago.  She was seen by aCmilla Ly in 2023 at which time they discussed that she likely has migraines with aura.  She underwent imaging following that visit, all of which was normal.  At today's visit, she reports about 1-2 headache days per month.  She typically does not take any medication for her pain.  She was open to trying magnesium and B2 supplements for prevention so I will have her start those.  From an abortive standpoint, she can take over-the-counter medication as needed.  Her primary issue continues to be tinnitus for which she is following with the ENT.      Workup:  - Neurologic assessment reveals unremarkable neurological exam.  - MRI brain IAC 2023: No acute findings.  No white matter changes.  Normal-appearing 7th and 8th cranial nerve complexes.  - CTA head 10/13/2023: No evidence of high-grade stenosis, occlusion, or aneurysm.  No concern for outpouching at the A1-A2 junction as previously seen on MRA.    Preventative:  - we discussed headache hygiene and lifestyle factors that may improve headaches  - Mg and B2  - Currently on through other providers: None  - Past/ failed/contraindicated: avoid BB due to bradycardia  - future options: Topamax, TCA/SNRI,  CGRP med, botox    Acute:  - discussed not taking over-the-counter or prescription pain medications more than 3 days per week to prevent medication overuse/rebound headache  - Currently on through other providers: None  - Past/  failed/contraindicated: None  - future options:  Triptan,  prochlorperazine, Toradol IM or p.o., could consider trial of 5 days of Depakote 500 mg nightly or dexamethasone 2 mg daily for prolonged migraine, ubrelvy, reyvow, nurtec, zavzpret  Patient instructions   Headache Calendar  Please maintain a headache calendar  Consider using phone applications such as Migraine Troy or [employer] Migraine Tracker    Headache/migraine treatment:   Acute medications (for immediate treatment of a headache):   It is ok to take ibuprofen, acetaminophen or naproxen (Advil, Tylenol,  Aleve, Excedrin) if they help your headaches you should limit these to No more than 2-3 times a week to avoid medication overuse/rebound headaches.     Over the counter preventive supplements for headaches/migraines (if you try, try for 3 months straight)  (to take every day to help prevent headaches - not to take at the time of headache):  [x] Magnesium 400mg daily (If any diarrhea or upset stomach, decrease dose  as tolerated) - oxide or glycinate  [x] Riboflavin (Vitamin B2) 400mg daily - (FYI B2 may make your urine bright/neon yellow)    Lifestyle Recommendations:  [x] SLEEP - Maintain a regular sleep schedule: Adults need at least 7-8 hours of uninterrupted a night. Maintain good sleep hygiene:  Going to bed and waking up at consistent times, avoiding excessive daytime naps, avoiding caffeinated beverages in the evening, avoid excessive stimulation in the evening and generally using bed primarily for sleeping.  One hour before bedtime would recommend turning lights down lower, decreasing your activity (may read quietly, listen to music at a low volume). When you get into bed, should eliminate all technology (no texting, emailing, playing with your phone, iPad or tablet in bed).  [x] HYDRATION - Maintain good hydration.  Drink  2L of fluid a day (4 typical small water bottles)  [x] DIET - Maintain good nutrition. In particular don't skip meals and  try and eat healthy balanced meals regularly.  [x] TRIGGERS - Look for other triggers and avoid them: Limit caffeine to 1-2 cups a day or less. Avoid dietary triggers that you have noticed bring on your headaches (this could include aged cheese, peanuts, MSG, aspartame and nitrates).  [x] EXERCISE - physical exercise as we all know is good for you in many ways, and not only is good for your heart, but also is beneficial for your mental health, cognitive health and  chronic pain/headaches. I would encourage at the least 5 days of physical exercise weekly for at least 30 minutes.     Education and Follow-up  [x] Please call with any questions or concerns. Of course if any new concerning symptoms go to the emergency department.  [x] Follow up in 6 months  CC:   We had the pleasure of evaluating Aziza Armstrong in neurological consultation today. Aziza Armstrong is a  right handed female who presents today for evaluation of headaches.     History obtained from patient as well as available medical record review.  History of Present Illness:   Current medical illnesses  or past medical history include migraines, obesity    Pertinent history:  -Last seen by Camilla Ly in August 2023. Treatment options were discussed, but she preferred to obtain imaging first    Headaches started at what age? 38 years old (went away and then worsened after COVID shot and getting COVID)   How often do the headaches occur?   - as of 2/9/2024: 1-2/30  What time of the day do the headaches start?  No particular time of day  How long do the headaches last? All day  Are you ever headache free? Yes    Aura? with aura - flashing lights in the eye (not necessarily with headache)     Where is your headache located and pain quality? Left side of forehead and behind the eye; pressure  What is the intensity of pain? Worst 10/10, Average: 3/10  Associated symptoms:   [x] Nausea       [x] Vomiting (1-2 times)   [x] Photophobia      [x]Phonophobia  [x] Blurred vision   [x] Prefer quiet, dark room  [x] Tinnitus (not associated with headache)  [x] Left facial tingling    Things that make the headache worse? No specific movements    Headache triggers:  None    Have you seen someone else for headaches or pain? Yes, neurology  Have you had trigger point injection performed and how often? No  Have you had Botox injection performed and how often? No   Have you had epidural injections or transforaminal injections performed? No  Are you current pregnant or planning on getting pregnant? No  Have you ever had any Brain imaging? yes MRI, CTA    Last eye exam: Feb-Mar in 2023 - suspected to have ocular migraines - eye itself looked okay    What medications do you take or have you taken for your headaches?   ABORTIVE:    OTC medications: None  Prescription: None    Past/ failed/contraindicated:  OTC medications: None  Prescription: None    PREVENTIVE:   None    Past/ failed/contraindicated:  Avoid beta-blockers due to history of bradycardia      LIFESTYLE  Sleep   - averages: about 5-7 hours per night  Problems falling asleep?:   No  Problems staying asleep?:  Yes  - History of snoring    Physical activity: Walk, Golf    Water: about 6-8 cups per day  Caffeine: Occasional cup of black tea    Mood:   History of anxiety  - Typically with work and recent illnesses    The following portions of the patient's history were reviewed and updated as appropriate: allergies, current medications, past family history, past medical history, past social history, past surgical history and problem list.    Pertinent family history:  Family history of headaches:  migraine headaches in mother and grandmother  Any family history of aneurysms - No    Pertinent social history:  Work: Dental Hygienist  Education: Master  Lives with     Illicit Drugs: denies  Alcohol/tobacco: Denies tobacco use, alcohol intake: social drinker  Past Medical History:     Past Medical History:    Diagnosis Date    Colon polyp     Nasal congestion        Patient Active Problem List   Diagnosis    PONV (postoperative nausea and vomiting)    Migraine with aura and without status migrainosus, not intractable    Tinnitus of left ear    Facial tingling       Medications:      Current Outpatient Medications   Medication Sig Dispense Refill    calcium citrate-vitamin D (CITRACAL+D) 315-200 MG-UNIT per tablet Take by mouth      fluticasone (FLONASE) 50 mcg/act nasal spray 1 spray into each nostril 2 (two) times a day 16 g 3    ketoconazole (NIZORAL) 2 % shampoo USE SHAMPOO 2 TO 3 TIMES A WEEK FOR ACTIVE RASH. APPLY TO SCALP A...  (REFER TO PRESCRIPTION NOTES).      mupirocin (BACTROBAN) 2 % ointment Apply topically daily 22 g 0     No current facility-administered medications for this visit.        Allergies:      Allergies   Allergen Reactions    Codeine Hives    Erythromycin Edema    Latex Hives       Family History:     Family History   Problem Relation Age of Onset    Diabetes Mother     Hyperlipidemia Mother     Hypertension Mother     Migraines Mother     Skin cancer Mother     Hypertension Father     Heart disease Father     Lung cancer Father 70    Hyperlipidemia Sister     Asthma Sister     Hypertension Sister     No Known Problems Sister     Asthma Maternal Grandmother     Coronary artery disease Maternal Grandmother     Prostate cancer Maternal Grandfather         not sure prostate    Alcohol abuse Maternal Grandfather     Colon cancer Paternal Grandmother         age unknown    No Known Problems Paternal Grandfather     Hypertension Brother 53        with mets    Lung cancer Brother 53        with mets    Lymphoma Brother     Brain cancer Brother     Alcohol abuse Brother     Hyperlipidemia Brother     Breast cancer Paternal Aunt 76    No Known Problems Paternal Aunt     No Known Problems Paternal Aunt     No Known Problems Paternal Aunt     No Known Problems Paternal Aunt     No Known Problems Paternal  "Aunt        Social History:       Social History     Socioeconomic History    Marital status: /Civil Union     Spouse name: Not on file    Number of children: Not on file    Years of education: Not on file    Highest education level: Not on file   Occupational History    Not on file   Tobacco Use    Smoking status: Never    Smokeless tobacco: Never    Tobacco comments:     no/never passive smoke exposure   Vaping Use    Vaping status: Never Used   Substance and Sexual Activity    Alcohol use: Not Currently    Drug use: Never    Sexual activity: Not on file   Other Topics Concern    Not on file   Social History Narrative    Not on file     Social Determinants of Health     Financial Resource Strain: Not on file   Food Insecurity: Not on file   Transportation Needs: Not on file   Physical Activity: Not on file   Stress: Not on file   Social Connections: Not on file   Intimate Partner Violence: Not on file   Housing Stability: Not on file         Objective:   Physical Exam:                                                               Vitals:            Constitutional:  /70 (BP Location: Left arm, Patient Position: Sitting, Cuff Size: Adult)   Pulse 86   Temp (!) 96.5 °F (35.8 °C) (Temporal)   Ht 5' 4\" (1.626 m)   Wt 84.1 kg (185 lb 4.8 oz)   SpO2 97%   BMI 31.81 kg/m²   BP Readings from Last 3 Encounters:   02/09/24 110/70   08/07/23 138/91   06/24/22 106/76     Pulse Readings from Last 3 Encounters:   02/09/24 86   08/07/23 65   06/24/22 (!) 54         Well developed, well nourished, well groomed. No dysmorphic features.       HEENT:  Normocephalic atraumatic.   Oropharynx is clear and moist. No oral mucosal lesions.   Chest:  Respirations regular and unlabored.    Cardiovascular:  Distal extremities warm without palpable edema or tenderness, no observed significant swelling.    Musculoskeletal:  (see below under neurologic exam for evaluation of motor function and gait)   Skin:  warm and dry, not " diaphoretic. No apparent birthmarks or stigmata of neurocutaneous disease.   Psychiatric:  Normal behavior and appropriate affect       Neurological Examination:     Mental status/cognitive function:   Orientated to time, place and person. Recent and remote memory intact. Attention span and concentration as well as fund of knowledge are appropriate for age. Normal language and spontaneous speech.     Cranial Nerves:  II-visual fields full.   Fundi poorly visualized due to pupillary constriction  III, IV, VI-Pupils were equal, round, and reactive to light and accomodation. Extraocular movements were full and conjugate without nystagmus.  Conjugate gaze, normal smooth pursuits, normal saccades   V-facial sensation symmetric.    VII-facial expression symmetric, intact forehead wrinkle, strong eye closure, symmetric smile    VIII-hearing grossly intact bilaterally   IX, X-palate elevation symmetric, no dysarthria.   XI-shoulder shrug strength intact    XII-tongue protrusion midline.    Motor Exam: symmetric bulk and tone throughout, no pronator drift. Power/strength 5/5 bilateral upper and lower extremities, no atrophy, fasciculations or abnormal movements noted.   Sensory: grossly intact light touch in all extremities.   Reflexes: brachioradialis 2+, biceps 2+, knee 2+, ankle 2+ bilaterally. No ankle clonus  Coordination: Finger nose finger intact bilaterally, no apparent dysmetria, ataxia or tremor noted  Gait: steady casual and tandem gait.     Pertinent lab results: None     Pertinent Imaging:   -MRI brain IAC 5/12/2023: No acute findings.  No white matter changes.  Normal-appearing 7th and 8th cranial nerve complexes.  -CTA head 10/13/2023: No evidence of high-grade stenosis, occlusion, or aneurysm.  No concern for outpouching at the A1-A2 junction as previously seen on MRA.    I have personally reviewed imaging and radiology read  Review of Systems:   Constitutional:  Negative for appetite change, fatigue and  fever.   HENT: Negative.  Negative for hearing loss, trouble swallowing and voice change.    Eyes: Negative.  Negative for photophobia, pain and visual disturbance.   Respiratory: Negative.  Negative for shortness of breath.    Cardiovascular: Negative.  Negative for palpitations.   Gastrointestinal: Negative.  Negative for nausea and vomiting.   Endocrine: Negative.  Negative for cold intolerance.   Genitourinary: Negative.  Negative for dysuria, frequency and urgency.   Musculoskeletal:  Negative for back pain, gait problem, myalgias, neck pain and neck stiffness.   Skin: Negative.  Negative for rash.   Allergic/Immunologic: Negative.    Neurological: Positive for headaches and tinnitus.  Negative for dizziness, tremors, seizures, syncope, facial asymmetry, speech difficulty, weakness, light-headedness, numbness.   Hematological: Negative.  Does not bruise/bleed easily.   Psychiatric/Behavioral: Negative.  Negative for confusion, hallucinations and sleep disturbance.    All other systems reviewed and are negative.     I have spent 30 minutes with the patient today in which greater than 50% of this time was spent in counseling/coordination of care regarding Diagnostic results, Prognosis, Risks and benefits of tx options, Patient and family education, Impressions, Documenting in the medical record, Reviewing / ordering tests, medicine, procedures  , and Obtaining or reviewing history  . I also spent 15 minutes non face to face for this patient the same day.     Activity Minutes   Precharting/reviewing 10   Patient care/counseling 30   Postcharting/care coordination 5       Author:  Michael Starr DO 2/9/2024 12:04 PM

## 2024-02-09 NOTE — PATIENT INSTRUCTIONS
Headache Calendar  Please maintain a headache calendar  Consider using phone applications such as Migraine Tryo or [place] Migraine Tracker    Headache/migraine treatment:   Acute medications (for immediate treatment of a headache):   It is ok to take ibuprofen, acetaminophen or naproxen (Advil, Tylenol,  Aleve, Excedrin) if they help your headaches you should limit these to No more than 2-3 times a week to avoid medication overuse/rebound headaches.     Over the counter preventive supplements for headaches/migraines (if you try, try for 3 months straight)  (to take every day to help prevent headaches - not to take at the time of headache):  [x] Magnesium 400mg daily (If any diarrhea or upset stomach, decrease dose  as tolerated) - oxide or glycinate  [x] Riboflavin (Vitamin B2) 400mg daily - (FYI B2 may make your urine bright/neon yellow)    Lifestyle Recommendations:  [x] SLEEP - Maintain a regular sleep schedule: Adults need at least 7-8 hours of uninterrupted a night. Maintain good sleep hygiene:  Going to bed and waking up at consistent times, avoiding excessive daytime naps, avoiding caffeinated beverages in the evening, avoid excessive stimulation in the evening and generally using bed primarily for sleeping.  One hour before bedtime would recommend turning lights down lower, decreasing your activity (may read quietly, listen to music at a low volume). When you get into bed, should eliminate all technology (no texting, emailing, playing with your phone, iPad or tablet in bed).  [x] HYDRATION - Maintain good hydration.  Drink  2L of fluid a day (4 typical small water bottles)  [x] DIET - Maintain good nutrition. In particular don't skip meals and try and eat healthy balanced meals regularly.  [x] TRIGGERS - Look for other triggers and avoid them: Limit caffeine to 1-2 cups a day or less. Avoid dietary triggers that you have noticed bring on your headaches (this could include aged cheese, peanuts, MSG,  aspartame and nitrates).  [x] EXERCISE - physical exercise as we all know is good for you in many ways, and not only is good for your heart, but also is beneficial for your mental health, cognitive health and  chronic pain/headaches. I would encourage at the least 5 days of physical exercise weekly for at least 30 minutes.     Education and Follow-up  [x] Please call with any questions or concerns. Of course if any new concerning symptoms go to the emergency department.  [x] Follow up in 6 months

## 2024-03-26 ENCOUNTER — HOSPITAL ENCOUNTER (OUTPATIENT)
Dept: RADIOLOGY | Facility: HOSPITAL | Age: 59
Discharge: HOME | End: 2024-03-26
Payer: COMMERCIAL

## 2024-03-26 DIAGNOSIS — M25.562 LEFT KNEE PAIN, UNSPECIFIED CHRONICITY: ICD-10-CM

## 2024-03-26 DIAGNOSIS — M25.552 LEFT HIP PAIN: ICD-10-CM

## 2024-03-26 PROCEDURE — 73502 X-RAY EXAM HIP UNI 2-3 VIEWS: CPT

## 2024-03-26 PROCEDURE — 73564 X-RAY EXAM KNEE 4 OR MORE: CPT

## 2024-06-13 ENCOUNTER — EVALUATION (OUTPATIENT)
Dept: PHYSICAL THERAPY | Facility: CLINIC | Age: 59
End: 2024-06-13
Payer: COMMERCIAL

## 2024-06-13 DIAGNOSIS — M54.42 ACUTE LEFT-SIDED LOW BACK PAIN WITH LEFT-SIDED SCIATICA: Primary | ICD-10-CM

## 2024-06-13 PROCEDURE — 97161 PT EVAL LOW COMPLEX 20 MIN: CPT | Performed by: PHYSICAL THERAPIST

## 2024-06-13 PROCEDURE — 97112 NEUROMUSCULAR REEDUCATION: CPT | Performed by: PHYSICAL THERAPIST

## 2024-06-13 NOTE — LETTER
2024    Anish Price DO  [address] [facility] [address]    Patient: Aziza Armstrong   YOB: 1965   Date of Visit: 2024     Encounter Diagnosis     ICD-10-CM    1. Acute left-sided low back pain with left-sided sciatica  M54.42           Dear Dr. Price:    Thank you for your recent referral of Aziza Armstrong. Please review the attached evaluation summary from Aziza's recent visit.     Please verify that you agree with the plan of care by signing the attached order.     If you have any questions or concerns, please do not hesitate to call.     I sincerely appreciate the opportunity to share in the care of one of your patients and hope to have another opportunity to work with you in the near future.       Sincerely,    Bharti Lazo, PT      Referring Provider:      I certify that I have read the below Plan of Care and certify the need for these services furnished under this plan of treatment while under my care.                    Anish Price DO  [address] [facility] [address]  Via Fax: [phone]          PT Evaluation     Today's date: 2024  Patient name: Aziza Armstrong  : 1965  MRN: [id number]  Referring provider: Anish Price DO  Dx:   Encounter Diagnosis     ICD-10-CM    1. Acute left-sided low back pain with left-sided sciatica  M54.42                      Assessment  Functional limitations: somewhat interrupted sleep, limited walking tolerance    Assessment details: Aziza Armstrong is a 58 yo female with acute on chronic low back pain with left sided radicular symptoms. She presents with poor activation of TA, decreased bilateral hip strength, and somewhat decreased flexibility. She is an excellent candidate for OPPT to address the above impairments and optimize functional status.     Goals  4-6 weeks  1. Increase B hip extensor/adductor strength by 1 MMT grade to indicate improved core stability.  2. Tolerate  standing/walking 30-60 minutes without onset of pain to allow resuming PLOF.  3. Normalize BLE flexibility to promote good body mechanics during daily tasks.  4. Independent with HEP at discharge.  5. Eliminate radicular pain with repeated motions of lumbar spine.        Plan  Patient would benefit from: PT eval and skilled physical therapy    Planned therapy interventions: manual therapy, neuromuscular re-education, patient/caregiver education, therapeutic activities, therapeutic exercise and home exercise program    Frequency: 2x week  Duration in weeks: 6  Treatment plan discussed with: patient  Plan details: Provided with and reviewed initial written HEP.  Reviewed physical exam findings and plan of care.  All questions answered to patient's satisfaction.          Subjective Evaluation    History of Present Illness  Mechanism of injury: Patient began having left knee pain in March with walking on uneven surfaces. Seemed to evolve into left sided low back and lateral hip pain. Has a history of left sided sciatica, flared with a fall in 2018. Feels like her left knee is weak and that she even has a bit of a drop foot. Saw her PCP and was referred to OPPT.      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[employer])  Exercise history: walking      Diagnostic Tests  No diagnostic tests performed  Treatments  Previous treatment: chiropractic        Objective     Active Range of Motion     Lumbar   Flexion:  WFL  Extension:  WFL    Joint Play   Joints within functional limits: L1, L2, L3, L4, L5 and S1   Mechanical Assessment    Cervical      Thoracic      Lumbar    Lying extension: repeated movements  Pain level: decreased    Strength/Myotome Testing     Left Hip    Planes of Motion   Flexion: 5  Extension: 4  Abduction: 5  Adduction: 4  External rotation: 5    Right Hip   Planes of Motion   Flexion: 5  Extension: 4  Abduction: 5  Adduction: 4  External rotation: 5    Left Knee   Flexion: 5  Extension: 5    Right Knee   Flexion: 5  Extension: 5    Left Ankle/Foot   Dorsiflexion: 5  Great toe extension: 5    Right Ankle/Foot   Dorsiflexion: 5  Great toe extension: 5    Muscle Activation     Additional Muscle Activation Details  Poor recruitment and endurance of TA  No subjective weakness of pelvic floor    Tests     Lumbar   Negative prone instability .     Left   Negative crossed SLR and passive SLR.     Left Hip   Positive piriformis.   Clifton: Negative.   90/90 SLR: Negative.     Right Hip   Clifton: Negative.    90/90 SLR: Negative.      HEP provided as follows:  Access [email or web address]: [email or web address]  URL: [email or web address]  Date: 06/13/2024  Prepared by: Bharti Lazo    Exercises  - Prone Press Up  - 3-5 x daily - 7 x weekly - 10 reps  - Quadruped Transversus Abdominis Bracing  - 3-5 x daily - 7 x weekly - 3 sets - 5 reps - 5 hold  - Hooklying Transversus Abdominis Palpation  - 3-5 x daily - 7 x weekly - 5 reps - 5 hold  - Supine Bridge with Mini Swiss Ball Between Knees  - 1 x daily - 7 x weekly - 20 reps - 5 hold  - Sidelying Hip Adduction  - 1 x daily - 7 x weekly - 3 sets - 10 reps  - Donkey Kick  - 1 x daily - 7 x weekly - 3 sets - 10 reps  - Supine Figure 4 Piriformis Stretch with Leg Extension  - 1 x daily - 7 x weekly - 3 reps - 30 hold        Precautions: hypothyroidism      Manuals 6/13                                                                Neuro Re-Ed             TA             TA + bridge w/ ball sq             TA+ bent knee fall out             TA + donkey kick             TA  + press out             TA + glute sq w/ step up F/L             Pt ed HEP rev            Ther Ex             REIL             Piriformis str             TA + treadmill              TA + rev, lat slider lunge if able                                                                 Ther Activity                                       Gait Training                                       Modalities

## 2024-06-13 NOTE — PROGRESS NOTES
PT Evaluation     Today's date: 2024  Patient name: Aziza Armstrong  : 1965  MRN: [id number]  Referring provider: Anish Price DO  Dx:   Encounter Diagnosis     ICD-10-CM    1. Acute left-sided low back pain with left-sided sciatica  M54.42                      Assessment  Functional limitations: somewhat interrupted sleep, limited walking tolerance    Assessment details: Aziza Armstrong is a 58 yo female with acute on chronic low back pain with left sided radicular symptoms. She presents with poor activation of TA, decreased bilateral hip strength, and somewhat decreased flexibility. She is an excellent candidate for OPPT to address the above impairments and optimize functional status.     Goals  4-6 weeks  1. Increase B hip extensor/adductor strength by 1 MMT grade to indicate improved core stability.  2. Tolerate standing/walking 30-60 minutes without onset of pain to allow resuming PLOF.  3. Normalize BLE flexibility to promote good body mechanics during daily tasks.  4. Independent with HEP at discharge.  5. Eliminate radicular pain with repeated motions of lumbar spine.        Plan  Patient would benefit from: PT eval and skilled physical therapy    Planned therapy interventions: manual therapy, neuromuscular re-education, patient/caregiver education, therapeutic activities, therapeutic exercise and home exercise program    Frequency: 2x week  Duration in weeks: 6  Treatment plan discussed with: patient  Plan details: Provided with and reviewed initial written HEP.  Reviewed physical exam findings and plan of care.  All questions answered to patient's satisfaction.          Subjective Evaluation    History of Present Illness  Mechanism of injury: Patient began having left knee pain in March with walking on uneven surfaces. Seemed to evolve into left sided low back and lateral hip pain. Has a history of left sided sciatica, flared with a fall in . Feels like her left knee is weak and  that she even has a bit of a drop foot. Saw her PCP and was referred to OPPT.      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[employer])  Exercise history: walking      Diagnostic Tests  No diagnostic tests performed  Treatments  Previous treatment: chiropractic        Objective     Active Range of Motion     Lumbar   Flexion:  WFL  Extension:  WFL    Joint Play   Joints within functional limits: L1, L2, L3, L4, L5 and S1   Mechanical Assessment    Cervical      Thoracic      Lumbar    Lying extension: repeated movements  Pain level: decreased    Strength/Myotome Testing     Left Hip   Planes of Motion   Flexion: 5  Extension: 4  Abduction: 5  Adduction: 4  External rotation: 5    Right Hip   Planes of Motion   Flexion: 5  Extension: 4  Abduction: 5  Adduction: 4  External rotation: 5    Left Knee   Flexion: 5  Extension: 5    Right Knee   Flexion: 5  Extension: 5    Left Ankle/Foot   Dorsiflexion: 5  Great toe extension: 5    Right Ankle/Foot   Dorsiflexion: 5  Great toe extension: 5    Muscle Activation     Additional Muscle Activation Details  Poor recruitment and endurance of TA  No subjective weakness of pelvic floor    Tests     Lumbar   Negative prone instability .     Left   Negative crossed SLR and passive SLR.     Left Hip   Positive piriformis.   [place]: Negative.   90/90 SLR: Negative.     Right Hip   [place]if[place]: Negative.    90/90 SLR: Negative.      HEP provided as follows:  Access Code: [email or web address]  URL: [email or web address]  Date: 2024  Prepared by: Bharti Lazo    Exercises  - Prone Press Up  - 3-5 x daily - 7 x weekly - 10 reps  - Quadruped Transversus Abdominis Bracing  - 3-5 x daily - 7 x weekly - 3 sets - 5 reps - 5 hold  -  Hooklying Transversus Abdominis Palpation  - 3-5 x daily - 7 x weekly - 5 reps - 5 hold  - Supine Bridge with Mini Swiss Ball Between Knees  - 1 x daily - 7 x weekly - 20 reps - 5 hold  - Sidelying Hip Adduction  - 1 x daily - 7 x weekly - 3 sets - 10 reps  - Donkey Kick  - 1 x daily - 7 x weekly - 3 sets - 10 reps  - Supine Figure 4 Piriformis Stretch with Leg Extension  - 1 x daily - 7 x weekly - 3 reps - 30 hold        Precautions: hypothyroidism      Manuals 6/13                                                                Neuro Re-Ed             TA             TA + bridge w/ ball sq             TA+ bent knee fall out             TA + donkey kick             TA  + press out             TA + glute sq w/ step up F/L             Pt ed HEP rev            Ther Ex             REIL             Piriformis str             TA + treadmill             TA + rev, lat slider lunge if able                                                                 Ther Activity                                       Gait Training                                       Modalities

## 2024-06-18 ENCOUNTER — OFFICE VISIT (OUTPATIENT)
Dept: PHYSICAL THERAPY | Facility: CLINIC | Age: 59
End: 2024-06-18
Payer: COMMERCIAL

## 2024-06-18 DIAGNOSIS — M54.42 ACUTE LEFT-SIDED LOW BACK PAIN WITH LEFT-SIDED SCIATICA: Primary | ICD-10-CM

## 2024-06-18 PROCEDURE — 97112 NEUROMUSCULAR REEDUCATION: CPT

## 2024-06-18 PROCEDURE — 97110 THERAPEUTIC EXERCISES: CPT

## 2024-06-18 NOTE — PROGRESS NOTES
"Daily Note     Today's date: 2024  Patient name: Aziza Armstrong  : 1965  MRN: [id number]  Referring provider: Anish Price DO  Dx:   Encounter Diagnosis     ICD-10-CM    1. Acute left-sided low back pain with left-sided sciatica  M54.42                      Subjective: Pt reports having increased discomfort over the weekend.       Objective: See treatment diary below      Assessment: Pt did well with all new TE as listed, no increased pain during or post tx.       Plan: Continue per plan of care.      Precautions: hypothyroidism      Manuals                                                                Neuro Re-Ed             Bike  5'           TA  5\"x20           TA + bridge w/ ball sq  5\"x20           TA+ bent knee fall out  X20            TA+kickouts  x20           Prone prop  3'           TA + donkey kick             TA  + press out             TA + glute sq w/ step up F/L             Pt ed HEP rev            Ther Ex             REIL  2x10           Piriformis str  20\"x3           TA + treadmill             TA + rev, lat slider lunge if able                                                                 Ther Activity                                       Gait Training                                       Modalities                                            "

## 2024-06-20 ENCOUNTER — OFFICE VISIT (OUTPATIENT)
Dept: PHYSICAL THERAPY | Facility: CLINIC | Age: 59
End: 2024-06-20
Payer: COMMERCIAL

## 2024-06-20 DIAGNOSIS — M54.42 ACUTE LEFT-SIDED LOW BACK PAIN WITH LEFT-SIDED SCIATICA: Primary | ICD-10-CM

## 2024-06-20 PROCEDURE — 97110 THERAPEUTIC EXERCISES: CPT | Performed by: PHYSICAL THERAPIST

## 2024-06-20 PROCEDURE — 97112 NEUROMUSCULAR REEDUCATION: CPT | Performed by: PHYSICAL THERAPIST

## 2024-06-20 NOTE — PROGRESS NOTES
"Daily Note     Today's date: 2024  Patient name: Aziza Armstrong  : 1965  MRN: [id number]  Referring provider: Anish Price DO  Dx:   Encounter Diagnosis     ICD-10-CM    1. Acute left-sided low back pain with left-sided sciatica  M54.42                      Subjective: Pt reports that she felt better after last session.       Objective: See treatment diary below      Assessment: Pt had good tolerance to progression of program. Reported fatigue and soreness only.       Plan: Continue per plan of care.      Precautions: hypothyroidism      Manuals                                                               Neuro Re-Ed             Bike  5' 6'          TA  5\"x20           TA + bridge w/ ball sq  5\"x20 5\"x20          TA+ bent knee fall out  X20  x20          TA+kickouts  x20           Prone prop  3' 3'          Prone prop with glute sq   5\"x20          Prone alt UE/LE   X15 ea          TA + donkey kick   X20 ea          TA  + press out   GTB x15 ea          TA + glute sq w/ step up F/L             Pt ed HEP rev            Ther Ex             REIL  2x10 2x10          Piriformis str  20\"x3 20\"x3          TA + treadmill             TA + rev, lat slider lunge if able                                                                 Ther Activity                                       Gait Training                                       Modalities                                              "

## 2024-06-25 ENCOUNTER — OFFICE VISIT (OUTPATIENT)
Dept: PHYSICAL THERAPY | Facility: CLINIC | Age: 59
End: 2024-06-25
Payer: COMMERCIAL

## 2024-06-25 DIAGNOSIS — M54.42 ACUTE LEFT-SIDED LOW BACK PAIN WITH LEFT-SIDED SCIATICA: Primary | ICD-10-CM

## 2024-06-25 PROCEDURE — 97112 NEUROMUSCULAR REEDUCATION: CPT

## 2024-06-25 PROCEDURE — 97110 THERAPEUTIC EXERCISES: CPT

## 2024-06-25 NOTE — PROGRESS NOTES
"Daily Note     Today's date: 2024  Patient name: Aziza Armstrong  : 1965  MRN: [id number]  Referring provider: Anish Price DO  Dx:   Encounter Diagnosis     ICD-10-CM    1. Acute left-sided low back pain with left-sided sciatica  M54.42           Start Time: 0845  Stop Time: 930  Total time in clinic (min): 45 minutes    Subjective: Aziza reports cont discomfort and pain over her L glut region across her hip and down the front of her L thigh to her knee. States she was sore after last PT session. Reports walking up hill does not bother her, but when she walks on level surfaces or down hill she has more discomfort and has even noticed some weakness in her L foot causing a drop.       Objective: See treatment diary below      Assessment: Aziza Tolerated treatment well w/o any reported increased LBP or pain into L thigh.  She is making steady gains towards goals. She continues to be challenged with core stabilization ex's with appropriate muscle fatigue experienced.  Required vc's t/o session for proper positioning with ex's.  Aziza would benefit from continued PT and compliance with HEP.        Plan: Continue per plan of care.      Precautions: hypothyroidism      Manuals                                                              Neuro Re-Ed             Bike  5' 6' 6'         TA  5\"x20  W/ ex's         TA + bridge w/ ball sq  5\"x20 5\"x20 5\"x20         TA+ bent knee fall out  X20  x20 5\"x20          TA+kickouts  x20  NP         Prone prop  3' 3' 3'         Prone prop with glute sq   5\"x20 5\"x20          Prone alt UE/LE   X15 ea 20x ea          TA + donkey kick   X20 ea 20x ea          TA  + press out   GTB x15 ea GTB 5\"x10 ea          TA + glute sq w/ step up F/L    NV         Pt ed HEP rev            Ther Ex             REIL  2x10 2x10 2x10          Piriformis str  20\"x3 20\"x3 20\"x3         TA + treadmill    NV         TA + rev, lat slider lunge if able    NV             "                                                 Ther Activity                                       Gait Training                                       Modalities

## 2024-06-27 ENCOUNTER — OFFICE VISIT (OUTPATIENT)
Dept: PHYSICAL THERAPY | Facility: CLINIC | Age: 59
End: 2024-06-27
Payer: COMMERCIAL

## 2024-06-27 DIAGNOSIS — M54.42 ACUTE LEFT-SIDED LOW BACK PAIN WITH LEFT-SIDED SCIATICA: Primary | ICD-10-CM

## 2024-06-27 PROCEDURE — 97110 THERAPEUTIC EXERCISES: CPT | Performed by: PHYSICAL THERAPIST

## 2024-06-27 PROCEDURE — 97112 NEUROMUSCULAR REEDUCATION: CPT | Performed by: PHYSICAL THERAPIST

## 2024-06-27 NOTE — PROGRESS NOTES
"Daily Note     Today's date: 2024  Patient name: Aziza Armstrong  : 1965  MRN: [id number]  Referring provider: Anish Price DO  Dx:   Encounter Diagnosis     ICD-10-CM    1. Acute left-sided low back pain with left-sided sciatica  M54.42                      Subjective: Pt reports she's had ups and downs; pain is mostly with walking.       Objective: See treatment diary below      Assessment: Tolerated treatment well. Patient demonstrated fatigue post treatment, exhibited good technique with therapeutic exercises, and would benefit from continued PT      Plan: Continue per plan of care.      Precautions: hypothyroidism      Manuals                                                             Neuro Re-Ed             Bike  5' 6' 6'         TA  5\"x20  W/ ex's         TA + bridge w/ ball sq  5\"x20 5\"x20 5\"x20 5\"x20        TA+ bent knee fall out  X20  x20 5\"x20  20x        TA+kickouts  x20  NP         Prone prop  3' 3' 3'         Prone prop with glute sq   5\"x20 5\"x20          Prone alt UE/LE   X15 ea 20x ea  20x ea        TA + donkey kick   X20 ea 20x ea  20x ea        TA  + press out   GTB x15 ea GTB 5\"x10 ea          TA + glute sq w/ step up F/L    NV 6\" 20x ea        Pt ed HEP rev            Ther Ex             REIL  2x10 2x10 2x10          Piriformis str  20\"x3 20\"x3 20\"x3 30\"x3        TA + treadmill    NV 8' 2.4mph        TA + rev, lat slider lunge if able    NV 10x ea                                                             Ther Activity                                       Gait Training                                       Modalities                                                  "

## 2024-07-09 ENCOUNTER — OFFICE VISIT (OUTPATIENT)
Dept: PHYSICAL THERAPY | Facility: CLINIC | Age: 59
End: 2024-07-09
Payer: COMMERCIAL

## 2024-07-09 DIAGNOSIS — M54.42 ACUTE LEFT-SIDED LOW BACK PAIN WITH LEFT-SIDED SCIATICA: Primary | ICD-10-CM

## 2024-07-09 PROCEDURE — 97110 THERAPEUTIC EXERCISES: CPT | Performed by: PHYSICAL THERAPIST

## 2024-07-09 PROCEDURE — 97140 MANUAL THERAPY 1/> REGIONS: CPT | Performed by: PHYSICAL THERAPIST

## 2024-07-09 PROCEDURE — 97112 NEUROMUSCULAR REEDUCATION: CPT | Performed by: PHYSICAL THERAPIST

## 2024-07-09 NOTE — PROGRESS NOTES
"Daily Note     Today's date: 2024  Patient name: Aziza Armstrong  : 1965  MRN: [id number]  Referring provider: Anish Price DO  Dx:   Encounter Diagnosis     ICD-10-CM    1. Acute left-sided low back pain with left-sided sciatica  M54.42                      Subjective: Pt reports she was in a lot of pain for the first several days of vacation but then felt well towards the end of it. Hasn't been having as much leg pain. Her  has been performing a piriformis release on her which she finds helpful.       Objective: See treatment diary below      Assessment: Tolerated treatment well. Patient exhibited good technique with therapeutic exercises and would benefit from continued PT      Plan: Continue per plan of care.      Precautions: hypothyroidism      Manuals        L piriformis release      KT       EPAT L piriformis D20T      3.0 barr #1                                 Neuro Re-Ed             Bike  5' 6' 6'         TA  5\"x20  W/ ex's         TA + bridge w/ ball sq  5\"x20 5\"x20 5\"x20 5\"x20        TA+ bent knee fall out  X20  x20 5\"x20  20x        TA+kickouts  x20  NP         Prone prop  3' 3' 3'         Prone prop with glute sq   5\"x20 5\"x20          Prone alt UE/LE   X15 ea 20x ea  20x ea 20x ea       TA + donkey kick   X20 ea 20x ea  20x ea        TA  + press out   GTB x15 ea GTB 5\"x10 ea          TA + glute sq w/ step up F/L    NV 6\" 20x ea        Pt ed HEP rev            Ther Ex             REIL  2x10 2x10 2x10   10x       Piriformis str  20\"x3 20\"x3 20\"x3 30\"x3 30\"x3       TA + treadmill    NV 8' 2.4mph 8' 2.5mph       TA + rev, lat slider lunge if able    NV 10x ea  10x ea       Self piriformis release with foam roller      2'                                              Ther Activity                                       Gait Training                                       Modalities                                                    "

## 2024-07-11 ENCOUNTER — OFFICE VISIT (OUTPATIENT)
Dept: PHYSICAL THERAPY | Facility: CLINIC | Age: 59
End: 2024-07-11
Payer: COMMERCIAL

## 2024-07-11 DIAGNOSIS — M54.42 ACUTE LEFT-SIDED LOW BACK PAIN WITH LEFT-SIDED SCIATICA: Primary | ICD-10-CM

## 2024-07-11 PROCEDURE — 97110 THERAPEUTIC EXERCISES: CPT | Performed by: PHYSICAL THERAPIST

## 2024-07-11 PROCEDURE — 97112 NEUROMUSCULAR REEDUCATION: CPT | Performed by: PHYSICAL THERAPIST

## 2024-07-11 NOTE — PROGRESS NOTES
"Daily Note     Today's date: 2024  Patient name: Aziza Armstrong  : 1965  MRN: [id number]  Referring provider: Anish Price DO  Dx:   Encounter Diagnosis     ICD-10-CM    1. Acute left-sided low back pain with left-sided sciatica  M54.42                      Subjective: Pt reports she's sore after last session but not much different than she has been.      Objective: See treatment diary below      Assessment: Tolerated treatment well. Patient exhibited good technique with therapeutic exercises and would benefit from continued PT      Plan: Continue per plan of care.      Precautions: hypothyroidism      Manuals       L piriformis release      KT deferred      EPAT L piriformis D20T      3.0 barr #1                                 Neuro Re-Ed             Bike  5' 6' 6'         TA  5\"x20  W/ ex's         TA + bridge w/ ball sq  5\"x20 5\"x20 5\"x20 5\"x20  5\"x20      TA+ bent knee fall out  X20  x20 5\"x20  20x  20x      TA+kickouts  x20  NP         Prone prop  3' 3' 3'         Prone prop with glute sq   5\"x20 5\"x20          Prone alt UE/LE   X15 ea 20x ea  20x ea 20x ea 20x ea      TA + donkey kick   X20 ea 20x ea  20x ea  20x ea      TA  + press out   GTB x15 ea GTB 5\"x10 ea          TA + glute sq w/ step up F/L    NV 6\" 20x ea        Pt ed HEP rev            Ther Ex             REIL  2x10 2x10 2x10   10x 10x      Piriformis str  20\"x3 20\"x3 20\"x3 30\"x3 30\"x3 30\"x3      TA + treadmill    NV 8' 2.4mph 8' 2.5mph 10'      TA + rev, lat slider lunge if able    NV 10x ea  10x ea 15x ea      Self piriformis release with foam roller      2' 2'                                             Ther Activity                                       Gait Training                                       Modalities                                                      "

## 2024-07-16 ENCOUNTER — OFFICE VISIT (OUTPATIENT)
Dept: PHYSICAL THERAPY | Facility: CLINIC | Age: 59
End: 2024-07-16
Payer: COMMERCIAL

## 2024-07-16 DIAGNOSIS — M54.42 ACUTE LEFT-SIDED LOW BACK PAIN WITH LEFT-SIDED SCIATICA: Primary | ICD-10-CM

## 2024-07-16 PROCEDURE — 97112 NEUROMUSCULAR REEDUCATION: CPT

## 2024-07-16 PROCEDURE — 97140 MANUAL THERAPY 1/> REGIONS: CPT

## 2024-07-16 PROCEDURE — 97110 THERAPEUTIC EXERCISES: CPT

## 2024-07-16 NOTE — PROGRESS NOTES
"Daily Note     Today's date: 2024  Patient name: Aziza Armstrong  : 1965  MRN: [id number]  Referring provider: Anish Price DO  Dx:   Encounter Diagnosis     ICD-10-CM    1. Acute left-sided low back pain with left-sided sciatica  M54.42                      Subjective: I've been feeling pretty good, but yesterday my L knee was bothering me.\"  Objective: See treatment diary below      Assessment: Discomfort during self piriformis release w/foam roller. Performed remaining ex program w/o complaint. EPAT to L piriformis, as below.   Tolerated treatment well. Will monitor. Patient  to decrease pain and tightness.      Plan: Continue per plan of care.      Precautions: hypothyroidism      Manuals      L piriformis release      KT deferred      EPAT L piriformis D20T      3.0 barr #1  3.2 barr  #2                               Neuro Re-Ed             Bike  5' 6' 6'         TA  5\"x20  W/ ex's         TA + bridge w/ ball sq  5\"x20 5\"x20 5\"x20 5\"x20  5\"x20 5\"x20     TA+ bent knee fall out  X20  x20 5\"x20  20x  20x 20x     TA+kickouts  x20  NP         Prone prop  3' 3' 3'         Prone prop with glute sq   5\"x20 5\"x20          Prone alt UE/LE   X15 ea 20x ea  20x ea 20x ea 20x ea 20x ea     TA + donkey kick   X20 ea 20x ea  20x ea  20x ea 20x ea     TA  + press out   GTB x15 ea GTB 5\"x10 ea          TA + glute sq w/ step up F/L    NV 6\" 20x ea        Pt ed HEP rev            Ther Ex             REIL  2x10 2x10 2x10   10x 10x 10x     Piriformis str  20\"x3 20\"x3 20\"x3 30\"x3 30\"x3 30\"x3 30\"x3     TA + treadmill    NV 8' 2.4mph 8' 2.5mph 10' 10' 2.5 mph     TA + rev, lat slider lunge if able    NV 10x ea  10x ea 15x ea 15x ea     Self piriformis release with foam roller      2' 2' 2'                                            Ther Activity                                       Gait Training                                       Modalities                                    "

## 2024-07-18 ENCOUNTER — OFFICE VISIT (OUTPATIENT)
Dept: PHYSICAL THERAPY | Facility: CLINIC | Age: 59
End: 2024-07-18
Payer: COMMERCIAL

## 2024-07-18 DIAGNOSIS — M54.42 ACUTE LEFT-SIDED LOW BACK PAIN WITH LEFT-SIDED SCIATICA: Primary | ICD-10-CM

## 2024-07-18 PROCEDURE — 97110 THERAPEUTIC EXERCISES: CPT | Performed by: PHYSICAL THERAPIST

## 2024-07-18 PROCEDURE — 97112 NEUROMUSCULAR REEDUCATION: CPT | Performed by: PHYSICAL THERAPIST

## 2024-07-18 NOTE — PROGRESS NOTES
"Daily Note     Today's date: 2024  Patient name: Aziza Armstrong  : 1965  MRN: [id number]  Referring provider: Anish Price DO  Dx:   Encounter Diagnosis     ICD-10-CM    1. Acute left-sided low back pain with left-sided sciatica  M54.42                      Subjective: Pt reports her chiropractor  has adjusted her twice this week and she's been doing the decompression table.       Objective: See treatment diary below      Assessment: Pt reports decreased pain with prone lying legs elevated. Good recall of exercises.      Plan: Continue per plan of care.      Precautions: hypothyroidism      Manuals     L piriformis release      KT deferred      EPAT L piriformis D20T      3.0 barr #1  3.2 barr  #2                               Neuro Re-Ed             Bike  5' 6' 6'         TA  5\"x20  W/ ex's         TA + bridge w/ ball sq  5\"x20 5\"x20 5\"x20 5\"x20  5\"x20 5\"x20 5\"x20    TA+ bent knee fall out  X20  x20 5\"x20  20x  20x 20x 20x    TA+kickouts  x20  NP         Prone prop  3' 3' 3'     Legs elevated 3'    Prone prop with glute sq   5\"x20 5\"x20          Prone alt UE/LE   X15 ea 20x ea  20x ea 20x ea 20x ea 20x ea 20x ea    TA + donkey kick   X20 ea 20x ea  20x ea  20x ea 20x ea 20x ea    TA  + press out   GTB x15 ea GTB 5\"x10 ea      20x ea grn    TA + glute sq w/ step up F/L    NV 6\" 20x ea        Pt ed HEP rev            Ther Ex             REIL  2x10 2x10 2x10   10x 10x 10x 10x    Piriformis str  20\"x3 20\"x3 20\"x3 30\"x3 30\"x3 30\"x3 30\"x3     TA + treadmill    NV 8' 2.4mph 8' 2.5mph 10' 10' 2.5 mph 10'    TA + rev, lat slider lunge if able    NV 10x ea  10x ea 15x ea 15x ea 20x ea    Self piriformis release with foam roller      2' 2' 2'                                            Ther Activity                                       Gait Training                                       Modalities                                                          "

## 2024-07-23 ENCOUNTER — OFFICE VISIT (OUTPATIENT)
Dept: PHYSICAL THERAPY | Facility: CLINIC | Age: 59
End: 2024-07-23
Payer: COMMERCIAL

## 2024-07-23 DIAGNOSIS — M54.42 ACUTE LEFT-SIDED LOW BACK PAIN WITH LEFT-SIDED SCIATICA: Primary | ICD-10-CM

## 2024-07-23 PROCEDURE — 97112 NEUROMUSCULAR REEDUCATION: CPT | Performed by: PHYSICAL THERAPIST

## 2024-07-23 PROCEDURE — 97110 THERAPEUTIC EXERCISES: CPT | Performed by: PHYSICAL THERAPIST

## 2024-07-23 NOTE — PROGRESS NOTES
"Daily Note     Today's date: 2024  Patient name: Aziza Armstrong  : 1965  MRN: [id number]  Referring provider: Anish Price DO  Dx:   Encounter Diagnosis     ICD-10-CM    1. Acute left-sided low back pain with left-sided sciatica  M54.42                      Subjective: Pt reports feeling ok but not great.       Objective: See treatment diary below      Assessment: Tolerated treatment well. Patient  with good carryover of program. Reports slider lunges aren't as difficult anymore.       Plan: Continue per plan of care.      Precautions: hypothyroidism      Manuals    L piriformis release      KT deferred      EPAT L piriformis D20T      3.0 barr #1  3.2 barr  #2                               Neuro Re-Ed             Bike  5' 6' 6'         TA  5\"x20  W/ ex's         TA + bridge w/ ball sq  5\"x20 5\"x20 5\"x20 5\"x20  5\"x20 5\"x20 5\"x20 5\"x20   TA+ bent knee fall out  X20  x20 5\"x20  20x  20x 20x 20x 20x   TA+kickouts  x20  NP         Prone prop  3' 3' 3'     Legs elevated 3' Legs elevated 3'   Prone prop with glute sq   5\"x20 5\"x20          Prone alt UE/LE   X15 ea 20x ea  20x ea 20x ea 20x ea 20x ea 20x ea 20x ea   TA + donkey kick   X20 ea 20x ea  20x ea  20x ea 20x ea 20x ea 20x ea   TA  + press out   GTB x15 ea GTB 5\"x10 ea      20x ea grn 20x ea blue   TA + glute sq w/ step up F/L    NV 6\" 20x ea        Pt ed HEP rev            Ther Ex             REIL  2x10 2x10 2x10   10x 10x 10x 10x 10x   Piriformis str  20\"x3 20\"x3 20\"x3 30\"x3 30\"x3 30\"x3 30\"x3     TA + treadmill    NV 8' 2.4mph 8' 2.5mph 10' 10' 2.5 mph 10' 10'   TA + rev, lat slider lunge if able    NV 10x ea  10x ea 15x ea 15x ea 20x ea 3x10 ea   Self piriformis release with foam roller      2' 2' 2'                                            Ther Activity                                       Gait Training                                       Modalities                                     "

## 2024-07-25 ENCOUNTER — OFFICE VISIT (OUTPATIENT)
Dept: PHYSICAL THERAPY | Facility: CLINIC | Age: 59
End: 2024-07-25
Payer: COMMERCIAL

## 2024-07-25 DIAGNOSIS — M54.42 ACUTE LEFT-SIDED LOW BACK PAIN WITH LEFT-SIDED SCIATICA: Primary | ICD-10-CM

## 2024-07-25 PROCEDURE — 97112 NEUROMUSCULAR REEDUCATION: CPT | Performed by: PHYSICAL THERAPIST

## 2024-07-25 PROCEDURE — 97110 THERAPEUTIC EXERCISES: CPT | Performed by: PHYSICAL THERAPIST

## 2024-07-25 NOTE — PROGRESS NOTES
"Daily Note     Today's date: 2024  Patient name: Aziza Armstrong  : 1965  MRN: [id number]  Referring provider: Anish Price DO  Dx:   Encounter Diagnosis     ICD-10-CM    1. Acute left-sided low back pain with left-sided sciatica  M54.42                      Subjective: Pt reports she was very sore the day after last session.      Objective: See treatment diary below      Assessment: Tolerated treatment well. Patient exhibited good technique with therapeutic exercises and would benefit from continued PT      Plan: Continue per plan of care.  Reassessment next visit.      Precautions: hypothyroidism      Manuals    L piriformis release KT     KT deferred      EPAT L piriformis D20T      3.0 barr #1  3.2 barr  #2                               Neuro Re-Ed             Bike             TA             TA + bridge w/ ball sq 5\"x20    5\"x20  5\"x20 5\"x20 5\"x20 5\"x20   TA+ bent knee fall out 20x    20x  20x 20x 20x 20x   TA+kickouts             Prone prop Legs elevated 3'        Legs elevated 3' Legs elevated 3'   Prone prop with glute sq             Prone alt UE/LE 20x ea    20x ea 20x ea 20x ea 20x ea 20x ea 20x ea   TA + donkey kick 20x ea    20x ea  20x ea 20x ea 20x ea 20x ea   TA  + press out 20x ea blue        20x ea grn 20x ea blue   TA + glute sq w/ step up F/L     6\" 20x ea        Pt ed             Ther Ex             REIL 10x     10x 10x 10x 10x 10x   Piriformis str 30\"x3    30\"x3 30\"x3 30\"x3 30\"x3     TA + treadmill 10'    8' 2.4mph 8' 2.5mph 10' 10' 2.5 mph 10' 10'   TA + rev, lat slider lunge if able 2x10 ea    10x ea  10x ea 15x ea 15x ea 20x ea 3x10 ea   Self piriformis release with foam roller      2' 2' 2'                                            Ther Activity                                       Gait Training                                       Modalities                                                              "

## 2024-07-30 ENCOUNTER — EVALUATION (OUTPATIENT)
Dept: PHYSICAL THERAPY | Facility: CLINIC | Age: 59
End: 2024-07-30
Payer: COMMERCIAL

## 2024-07-30 DIAGNOSIS — M54.42 ACUTE LEFT-SIDED LOW BACK PAIN WITH LEFT-SIDED SCIATICA: Primary | ICD-10-CM

## 2024-07-30 PROCEDURE — 97110 THERAPEUTIC EXERCISES: CPT | Performed by: PHYSICAL THERAPIST

## 2024-07-30 PROCEDURE — 97140 MANUAL THERAPY 1/> REGIONS: CPT | Performed by: PHYSICAL THERAPIST

## 2024-07-30 PROCEDURE — 97112 NEUROMUSCULAR REEDUCATION: CPT | Performed by: PHYSICAL THERAPIST

## 2024-07-30 NOTE — PROGRESS NOTES
Reassessment    Today's date: 2024  Patient name: Aziza Armstrong  : 1965  MRN: [id number]  Referring provider: Anish Price DO  Dx:   Encounter Diagnosis     ICD-10-CM    1. Acute left-sided low back pain with left-sided sciatica  M54.42                      Assessment  Assessment details: Aziza Armstrong is a 58 yo female with acute on chronic low back pain with left sided radicular symptoms. Her pain has centralized to her left low back/buttock. She reports 90% improvement, with the most improvement noted over the last 2-3 days. She remains a good candidate for OPPT to address continued impairments and optimize functional status.     Goals  4-6 weeks  1. Increase B hip extensor/adductor strength by 1 MMT grade to indicate improved core stability. - met for extensors but not adductors  2. Tolerate standing/walking 30-60 minutes without onset of pain to allow resuming PLOF. - not yet met consistently  3. Normalize BLE flexibility to promote good body mechanics during daily tasks. - ongoing  4. Independent with HEP at discharge. - ongoing; pt reports compliance  5. Eliminate radicular pain with repeated motions of lumbar spine. - met as of the last 2 days        Plan  Continue PT    Planned therapy interventions: manual therapy, neuromuscular re-education, patient/caregiver education, therapeutic activities, therapeutic exercise and home exercise program    Frequency: 2x week  Duration in weeks: 4  Treatment plan discussed with: patient  Plan details: Provided with and reviewed initial written HEP.  Reviewed physical exam findings and plan of care.  All questions answered to patient's satisfaction.          Subjective Evaluation  Patient reports appx 90% improvement since starting PT but has really noticed the most improvement in the last 2-3 days. Has an MRI scheduled for tomorrow.     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[employer])  Exercise history: walking      Diagnostic Tests  No diagnostic tests performed  Treatments  Previous treatment: chiropractic    Objective     Active Range of Motion     Lumbar   Flexion:  WFL  Extension:  WFL    Joint Play   Joints within functional limits: L1, L2, L3, L4, L5 and S1   Mechanical Assessment    Lumbar    Lying extension: repeated movements  Pain level: decreased    Strength/Myotome Testing     Left Hip   Planes of Motion   Flexion: 5  Extension: 5  Abduction: 5  Adduction: 4+  External rotation: 5    Right Hip   Planes of Motion   Flexion: 5  Extension: 5  Abduction: 5  Adduction: 4+  External rotation: 5    Left Knee   Flexion: 5  Extension: 5    Right Knee   Flexion: 5  Extension: 5    Left Ankle/Foot   Dorsiflexion: 5  Great toe extension: 5    Right Ankle/Foot   Dorsiflexion: 5  Great toe extension: 5    Muscle Activation     Additional Muscle Activation Details  Fair recruitment and endurance of TA  No subjective weakness of pelvic floor    Tests     Lumbar   Negative prone instability .     Left   Negative crossed SLR and passive SLR.     Left Hip   Positive piriformis.   Clifton: Negative.   90/90 SLR: Negative.     Right Hip   Clifton: Negative.    90/90 SLR: Negative.       Precautions: hypothyroidism      Manuals 7/25 7/30 7/16 7/18 7/23   L piriformis release KT            EPAT L piriformis D20T   Barr #3      3.2 dawn  #2                               Neuro Re-Ed             Bike             TA             TA + bridge w/ ball sq 5\"x20 5\"x20      5\"x20 5\"x20 5\"x20   TA+ bent knee fall out 20x 20x      20x 20x 20x   TA+kickouts             Prone prop Legs elevated 3' Legs elevated 3'       Legs elevated 3' Legs elevated 3'   Prone prop with glute sq             Prone alt UE/LE 20x ea 20x ea      20x ea 20x ea 20x ea   TA + donkey kick 20x ea 20x ea      20x ea 20x ea 20x ea " "  TA  + press out 20x ea blue 20x ea blue       20x ea grn 20x ea blue   TA + glute sq w/ step up F/L             Pt ed             Ther Ex             REIL 10x       10x 10x 10x   Piriformis str 30\"x3 30\"x3      30\"x3     TA + treadmill 10' 10'      10' 2.5 mph 10' 10'   TA + rev, lat slider lunge if able 2x10 ea 2x10 ea      15x ea 20x ea 3x10 ea   Self piriformis release with foam roller        2'                                            Ther Activity                                       Gait Training                                       Modalities                                                                "

## 2024-07-30 NOTE — LETTER
2024    Anish Price DO  [address] [facility] [address]    Patient: Aziza Armstrong   YOB: 1965   Date of Visit: 2024     Encounter Diagnosis     ICD-10-CM    1. Acute left-sided low back pain with left-sided sciatica  M54.42           Dear Dr. Price:    Thank you for your recent referral of Aziza Armstrong. Please review the attached evaluation summary from Aziza's recent visit.     Please verify that you agree with the plan of care by signing the attached order.     If you have any questions or concerns, please do not hesitate to call.     I sincerely appreciate the opportunity to share in the care of one of your patients and hope to have another opportunity to work with you in the near future.       Sincerely,    Bharti Lazo, PT      Referring Provider:      I certify that I have read the below Plan of Care and certify the need for these services furnished under this plan of treatment while under my care.                    Anish Price DO  [address] [facility] [address]  Via Fax: [phone]          Reassessment    Today's date: 2024  Patient name: Aziza Armstrong  : 1965  MRN: [id number]  Referring provider: Anish Price DO  Dx:   Encounter Diagnosis     ICD-10-CM    1. Acute left-sided low back pain with left-sided sciatica  M54.42                      Assessment  Assessment details: Aziza Armstrong is a 58 yo female with acute on chronic low back pain with left sided radicular symptoms. Her pain has centralized to her left low back/buttock. She reports 90% improvement, with the most improvement noted over the last 2-3 days. She remains a good candidate for OPPT to address continued impairments and optimize functional status.     Goals  4-6 weeks  1. Increase B hip extensor/adductor strength by 1 MMT grade to indicate improved core stability. - met for extensors but not adductors  2. Tolerate standing/walking 30-60 minutes  without onset of pain to allow resuming PLOF. - not yet met consistently  3. Normalize BLE flexibility to promote good body mechanics during daily tasks. - ongoing  4. Independent with HEP at discharge. - ongoing; pt reports compliance  5. Eliminate radicular pain with repeated motions of lumbar spine. - met as of the last 2 days        Plan  Continue PT    Planned therapy interventions: manual therapy, neuromuscular re-education, patient/caregiver education, therapeutic activities, therapeutic exercise and home exercise program    Frequency: 2x week  Duration in weeks: 4  Treatment plan discussed with: patient  Plan details: Provided with and reviewed initial written HEP.  Reviewed physical exam findings and plan of care.  All questions answered to patient's satisfaction.          Subjective Evaluation  Patient reports appx 90% improvement since starting PT but has really noticed the most improvement in the last 2-3 days. Has an MRI scheduled for tomorrow.     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[employer])  Exercise history: walking      Diagnostic Tests  No diagnostic tests performed  Treatments  Previous treatment: chiropractic    Objective     Active Range of Motion     Lumbar   Flexion:  WFL  Extension:  WFL    Joint Play   Joints within functional limits: L1, L2, L3, L4, L5 and S1   Mechanical Assessment    Lumbar    Lying extension: repeated movements  Pain level: decreased    Strength/Myotome Testing     Left Hip   Planes of Motion   Flexion: 5  Extension: 5  Abduction: 5  Adduction: 4+  External rotation: 5    Right Hip   Planes of Motion   Flexion: 5  Extension: 5  Abduction: 5  Adduction: 4+  External rotation: 5    Left Knee   Flexion: 5  Extension: 5    Right Knee   Flexion: 5  Extension: 5    Left Ankle/Foot   Dorsiflexion:  "5  Great toe extension: 5    Right Ankle/Foot   Dorsiflexion: 5  Great toe extension: 5    Muscle Activation     Additional Muscle Activation Details  Fair recruitment and endurance of TA  No subjective weakness of pelvic floor    Tests     Lumbar   Negative prone instability .     Left   Negative crossed SLR and passive SLR.     Left Hip   Positive piriformis.   Clifton: Negative.   90/90 SLR: Negative.     Right Hip   Clifton: Negative.    90/90 SLR: Negative.       Precautions: hypothyroidism      Manuals 7/25 7/30 7/16 7/18 7/23   L piriformis release KT            EPAT L piriformis D20T   Barr #3      3.2 dawn  #2                               Neuro Re-Ed             Bike             TA             TA + bridge w/ ball sq 5\"x20 5\"x20      5\"x20 5\"x20 5\"x20   TA+ bent knee fall out 20x 20x      20x 20x 20x   TA+kickouts             Prone prop Legs elevated 3' Legs elevated 3'       Legs elevated 3' Legs elevated 3'   Prone prop with glute sq             Prone alt UE/LE 20x ea 20x ea      20x ea 20x ea 20x ea   TA + donkey kick 20x ea 20x ea      20x ea 20x ea 20x ea   TA  + press out 20x ea blue 20x ea blue       20x ea grn 20x ea blue   TA + glute sq w/ step up F/L             Pt ed             Ther Ex             REIL 10x       10x 10x 10x   Piriformis str 30\"x3 30\"x3      30\"x3     TA + treadmill 10' 10'      10' 2.5 mph 10' 10'   TA + rev, lat slider lunge if able 2x10 ea 2x10 ea      15x ea 20x ea 3x10 ea   Self piriformis release with foam roller        2'                                            Ther Activity                                       Gait Training                                       Modalities                                                                                "

## 2024-08-01 ENCOUNTER — OFFICE VISIT (OUTPATIENT)
Dept: PHYSICAL THERAPY | Facility: CLINIC | Age: 59
End: 2024-08-01
Payer: COMMERCIAL

## 2024-08-01 DIAGNOSIS — M54.42 ACUTE LEFT-SIDED LOW BACK PAIN WITH LEFT-SIDED SCIATICA: Primary | ICD-10-CM

## 2024-08-01 PROCEDURE — 97110 THERAPEUTIC EXERCISES: CPT

## 2024-08-01 PROCEDURE — 97112 NEUROMUSCULAR REEDUCATION: CPT

## 2024-08-01 NOTE — PROGRESS NOTES
"Daily Note     Today's date: 2024  Patient name: Aziza Armstrong  : 1965  MRN: [id number]  Referring provider: Anish Price DO  Dx:   Encounter Diagnosis     ICD-10-CM    1. Acute left-sided low back pain with left-sided sciatica  M54.42           Start Time: 1016  Stop Time: 1104  Total time in clinic (min): 48 minutes      Subjective: Patient reports she is \"on the upswing.\"  Patient reports she is experiencing less pain and her sleep is improved.  Patient reports she is walking \"a little bit more.\"      Objective: See treatment diary below.      Assessment: TE program is tolerated well without complaints.  Encouraged compliance with HEP while traveling.      Plan: Continue treatment as per PT plan of care.  Next PT appointment is scheduled on 24.       Precautions: hypothyroidism      Manuals    L piriformis release KT            EPAT L piriformis D20T   Barr #3      3.2 dawn  #2                               Neuro Re-Ed             Bike             TA             TA + bridge w/ ball sq 5\"x20 5\"x20 5\"x20     5\"x20 5\"x20 5\"x20   TA+ bent knee fall out 20x 20x 20x     20x 20x 20x   TA+kickouts             Prone prop Legs elevated 3' Legs elevated 3' Legs elevated 3'      Legs elevated 3' Legs elevated 3'   Prone prop with glute sq             Prone alt UE/LE 20x ea 20x ea 20x ea     20x ea 20x ea 20x ea   TA + donkey kick 20x ea 20x ea 20x ea     20x ea 20x ea 20x ea   TA  + press out 20x ea blue 20x ea blue 20x ea blue      20x ea grn 20x ea blue   TA + glute sq w/ step up F/L             Pt ed             Ther Ex             REIL 10x       10x 10x 10x   Piriformis str 30\"x3 30\"x3 30\"x3     30\"x3     TA + treadmill 10' 10' 10'  2.5 mph     10' 2.5 mph 10' 10'   TA + rev, lat slider lunge if able 2x10 ea 2x10 ea 2x10 ea     15x ea 20x ea 3x10 ea   Self piriformis release with foam roller        2'                                            Ther Activity         "                               Gait Training                                       Modalities

## 2024-08-12 ENCOUNTER — OFFICE VISIT (OUTPATIENT)
Dept: PHYSICAL THERAPY | Facility: CLINIC | Age: 59
End: 2024-08-12
Payer: COMMERCIAL

## 2024-08-12 DIAGNOSIS — M54.42 ACUTE LEFT-SIDED LOW BACK PAIN WITH LEFT-SIDED SCIATICA: Primary | ICD-10-CM

## 2024-08-12 PROCEDURE — 97110 THERAPEUTIC EXERCISES: CPT | Performed by: PHYSICAL THERAPIST

## 2024-08-12 PROCEDURE — 97112 NEUROMUSCULAR REEDUCATION: CPT | Performed by: PHYSICAL THERAPIST

## 2024-08-12 NOTE — PROGRESS NOTES
"Daily Note     Today's date: 2024  Patient name: Aziza Armstrong  : 1965  MRN: [id number]  Referring provider: Anish Price DO  Dx:   Encounter Diagnosis     ICD-10-CM    1. Acute left-sided low back pain with left-sided sciatica  M54.42                      Subjective: Pt reports 95% improvement in pain levels since starting OPPT.       Objective: See treatment diary below      Assessment: Tolerated treatment well. Patient  with good carryover of program. Minimal cueing required.       Plan: Potential discharge next visit.     Precautions: hypothyroidism      Manuals    L piriformis release KT            EPAT L piriformis D20T   Barr #3      3.2 dawn  #2                               Neuro Re-Ed             Bike             TA             TA + bridge w/ ball sq 5\"x20 5\"x20 5\"x20 5\"x20    5\"x20 5\"x20 5\"x20   TA+ bent knee fall out 20x 20x 20x 20x    20x 20x 20x   TA+kickouts             Prone prop Legs elevated 3' Legs elevated 3' Legs elevated 3' Legs elevated 5'     Legs elevated 3' Legs elevated 3'   Prone prop with glute sq             Prone alt UE/LE 20x ea 20x ea 20x ea 20x    20x ea 20x ea 20x ea   TA + donkey kick 20x ea 20x ea 20x ea 20x ea    20x ea 20x ea 20x ea   TA  + press out 20x ea blue 20x ea blue 20x ea blue 20x ea blue     20x ea grn 20x ea blue   TA + glute sq w/ step up F/L             Pt ed             Ther Ex             REIL 10x       10x 10x 10x   Piriformis str 30\"x3 30\"x3 30\"x3 30\"x3    30\"x3     TA + treadmill 10' 10' 10'  2.5 mph 10'    10' 2.5 mph 10' 10'   TA + rev, lat slider lunge if able 2x10 ea 2x10 ea 2x10 ea 2x10 ea    15x ea 20x ea 3x10 ea   Self piriformis release with foam roller        2'                                            Ther Activity                                       Gait Training                                       Modalities                                                    "

## 2024-08-14 ENCOUNTER — OFFICE VISIT (OUTPATIENT)
Dept: NEUROLOGY | Facility: CLINIC | Age: 59
End: 2024-08-14
Payer: COMMERCIAL

## 2024-08-14 VITALS
HEART RATE: 67 BPM | HEIGHT: 64 IN | WEIGHT: 189 LBS | BODY MASS INDEX: 32.27 KG/M2 | DIASTOLIC BLOOD PRESSURE: 94 MMHG | SYSTOLIC BLOOD PRESSURE: 139 MMHG

## 2024-08-14 DIAGNOSIS — R20.2 PARESTHESIA: ICD-10-CM

## 2024-08-14 DIAGNOSIS — H93.12 TINNITUS OF LEFT EAR: ICD-10-CM

## 2024-08-14 DIAGNOSIS — G43.109 MIGRAINE WITH AURA AND WITHOUT STATUS MIGRAINOSUS, NOT INTRACTABLE: Primary | ICD-10-CM

## 2024-08-14 DIAGNOSIS — Z83.49 FAMILY HISTORY OF AMYLOIDOSIS: ICD-10-CM

## 2024-08-14 PROCEDURE — 99214 OFFICE O/P EST MOD 30 MIN: CPT | Performed by: PHYSICIAN ASSISTANT

## 2024-08-14 RX ORDER — MAGNESIUM 200 MG
200 TABLET ORAL DAILY
COMMUNITY
Start: 2024-03-20

## 2024-08-14 NOTE — PROGRESS NOTES
Ambulatory Visit  Name: Aziza Armstrnog      : 1965      MRN: [id number]  Encounter Provider: Camilla Ly PA-C  Encounter Date: 2024   Encounter department: NEUROLOGY [facility]    Assessment & Plan   1. Migraine with aura and without status migrainosus, not intractable  2. Tinnitus of left ear  3. Family history of amyloidosis  4. Paresthesia    Headaches are significantly reduced after adding B complex and magnesium.  Will continue the supplements daily.    As noted below the patient has developed a new onset numbness and sometimes tingling with discomfort in the first 3 fingers of the right hand which sounds to be consistent with carpal tunnel clinically.  I offered an EMG and the patient would like to consider this in the future.  She mentions that she would like to be tested for amyloidosis because her dad had it, and her sister is currently being tested for this by a cardiologist.  The patient feels that her symptoms may be related to amyloidosis.  I told her that I do not have enough experience with diagnosing, nor treating/preventing amyloidosis.  She would need to see someone who can perform genetic testing.  If we do not do it here, I will ask colleagues, I may refer her elsewhere.  Regardless I believe amyloidosis is self-limiting; so if she has discomfort in the right hand consistent with carpal tunnel then this could be treated by an orthopedic professional.  She can wear a hand brace at nighttime.  She should try to limit certain triggering postures with her palm on the ground at PT.    The patient should not hesitate to call me with any questions or concerns.        History of Present Illness     Aziza Armstrong is a 59 y.o. female who presents for f/u.  HPI: migraine f/u; Migraines are a lot better, way less, started taking magnesium and B12, a lot less stress.  The patient reports that headaches are significantly less severe and less frequent since starting  "magnesium and B complex vitamins.  She denies side effects to these medications.  She also reports less stress, which contributed to migraine headaches before.  Unfortunately she still has a low pitched or low-grade ringing in the left ear.  She does not notice it all the time, and typically notices it only when she is laying down to go to bed or if she is relaxing or in a quiet place.  She thinks that her headaches and ringing all started when she had COVID and she just never got better from that.  She finds the ringing to be a nuisance but it is not overly bothersome that she wants to get a hearing aid.  She states that she had a fall in 2018 and still with some knee pain on the left side, left back and left hip pain.  She is doing physical therapy for this currently and she saw an orthopedic doctor for it.      She developed a numbness and sometimes tingling in the first 3 fingers on the right hand and sometimes pain but it does not come from the neck.  She thinks it started because she is doing a lot of exercises at PT involving placing her palms on the ground.  This all started over the summertime; she states it is somewhat uncomfortable and she feels like she needs to shake it out and it subsides a bit.    She states her sister is being tested for amyloidosis by cardiologist.  She states her dad had amyloidosis and they found it later in his life.  She is wondering if all of her neurological symptoms are from amyloidosis and she is asking to be tested.    Migraines/ headaches:  Onset: 2020, however pt states in her 30s she saw will eye for \"anisocoria\" with headaches and had imaging at that time-- was told she has ?ocular migraine  Head injury: As noted above     Current pain: 0/10  Reaches pain level at worst: 10/10    Frequency:   8/14/24- 0-1 per month  Last visit-- 2 per month    Duration: severe for a few hours, dull ache fairly constantly  Location: left frontal/ around left eye  Quality: Pressure, achy, " dull, deep  Not worse with coughing, sneezing, etc.  Associated with: nausea, vomiting, insomnia, eye tearing, concentration problems, nasal congestion, flushing, tinnitus, left facial tingling/ cheek tingling, photophobia  Patient states she used to have change in pupil size but not recently (anisocoria dx per eye  2005)     Triggers: stress, weather changes, menopause?, prior covid shot, prior covid infection 11/2022  Aura/ warning: Flashing from the eye as noted above, not necessarily a warning sign however     Medications tried:  Prevention-  B-complex vitamin  saligesic  Magnesium     Abortive-  otc     Other non-medication therapies or treatments- chiropractor, ice packs, massage     Family hx of migraines: none  Family hx of cerebral aneurysms: none     Brain MRI with attention to the IACs with and without contrast 5/12/2023 is unremarkable.      Review of Systems  Constitutional:  Negative for appetite change, fatigue and fever.   HENT: Negative.  Negative for hearing loss, tinnitus, trouble swallowing and voice change.    Eyes: Negative.  Negative for photophobia, pain and visual disturbance.   Respiratory: Negative.  Negative for shortness of breath.    Cardiovascular: Negative.  Negative for palpitations.   Gastrointestinal: Negative.  Negative for nausea and vomiting.   Endocrine: Negative.  Negative for cold intolerance.   Genitourinary: Negative.  Negative for dysuria, frequency and urgency.   Musculoskeletal:  Negative for back pain, gait problem, myalgias, neck pain and neck stiffness.   Skin: Negative.  Negative for rash.   Allergic/Immunologic: Negative.    Neurological:  Positive for headaches. Negative for dizziness, tremors, seizures, syncope, facial asymmetry, speech difficulty, weakness, light-headedness and numbness.   Hematological: Negative.  Does not bruise/bleed easily.   Psychiatric/Behavioral: Negative.  Negative for confusion, hallucinations and sleep disturbance.       The following  portions of the patient's history were reviewed and updated as appropriate: allergies, current medications/ medication history, past family history, past medical history, past social history, past surgical history and problem list.    Review of systems was reviewed and otherwise unremarkable from a neurological perspective.      Pertinent Medical History         Medical History Reviewed by provider this encounter:  Tobacco  Allergies  Meds  Problems  Med Hx  Surg Hx  Fam Hx       Past Medical History   Past Medical History:   Diagnosis Date    Colon polyp     Nasal congestion      Past Surgical History:   Procedure Laterality Date    OOPHORECTOMY Right 2007     Family History   Problem Relation Age of Onset    Diabetes Mother     Hyperlipidemia Mother     Hypertension Mother     Migraines Mother     Skin cancer Mother     Hypertension Father     Heart disease Father     Lung cancer Father 70    Hyperlipidemia Sister     Asthma Sister     Hypertension Sister     No Known Problems Sister     Asthma Maternal Grandmother     Coronary artery disease Maternal Grandmother     Prostate cancer Maternal Grandfather         not sure prostate    Alcohol abuse Maternal Grandfather     Colon cancer Paternal Grandmother         age unknown    No Known Problems Paternal Grandfather     Hypertension Brother 53        with mets    Lung cancer Brother 53        with mets    Lymphoma Brother     Brain cancer Brother     Alcohol abuse Brother     Hyperlipidemia Brother     Breast cancer Paternal Aunt 76    No Known Problems Paternal Aunt     No Known Problems Paternal Aunt     No Known Problems Paternal Aunt     No Known Problems Paternal Aunt     No Known Problems Paternal Aunt      Current Outpatient Medications on File Prior to Visit   Medication Sig Dispense Refill    calcium citrate-vitamin D (CITRACAL+D) 315-200 MG-UNIT per tablet Take by mouth      fluticasone (FLONASE) 50 mcg/act nasal spray 1 spray into each nostril 2  "(two) times a day 16 g 3    ketoconazole (NIZORAL) 2 % shampoo USE SHAMPOO 2 TO 3 TIMES A WEEK FOR ACTIVE RASH. APPLY TO SCALP A...  (REFER TO PRESCRIPTION NOTES).      Magnesium 200 MG TABS Take 200 mg by mouth in the morning      mupirocin (BACTROBAN) 2 % ointment Apply topically daily 22 g 0    VITAMINS B1 B6 B12 PO Take 1 tablet by mouth daily       No current facility-administered medications on file prior to visit.     Allergies   Allergen Reactions    Codeine Hives    Erythromycin Edema    Latex Hives      Current Outpatient Medications on File Prior to Visit   Medication Sig Dispense Refill    calcium citrate-vitamin D (CITRACAL+D) 315-200 MG-UNIT per tablet Take by mouth      fluticasone (FLONASE) 50 mcg/act nasal spray 1 spray into each nostril 2 (two) times a day 16 g 3    ketoconazole (NIZORAL) 2 % shampoo USE SHAMPOO 2 TO 3 TIMES A WEEK FOR ACTIVE RASH. APPLY TO SCALP A...  (REFER TO PRESCRIPTION NOTES).      Magnesium 200 MG TABS Take 200 mg by mouth in the morning      mupirocin (BACTROBAN) 2 % ointment Apply topically daily 22 g 0    VITAMINS B1 B6 B12 PO Take 1 tablet by mouth daily       No current facility-administered medications on file prior to visit.      Social History     Tobacco Use    Smoking status: Never    Smokeless tobacco: Never    Tobacco comments:     no/never passive smoke exposure   Vaping Use    Vaping status: Never Used   Substance and Sexual Activity    Alcohol use: Not Currently    Drug use: Never    Sexual activity: Not on file     Objective     /94 (BP Location: Left arm, Patient Position: Sitting, Cuff Size: Standard)   Pulse 67   Ht 5' 4\" (1.626 m)   Wt 85.7 kg (189 lb)   BMI 32.44 kg/m²     Physical Exam  On neurologic exam, the patient is alert and oriented to time and place. Speech is fluent and articulate, and the patient follows commands appropriately. Judgment and affect appear normal. Pupils are equally round and reactive to light and extraocular muscles " are intact without nystagmus. Face is symmetric, and tongue, uvula, and palate are midline. Hearing is intact. Motor examination reveals intact strength throughout. Reflexes 2+ all 4 extr's. Normal gait is steady.      Administrative Statements   I have spent a total time of 30 minutes in caring for this patient on the day of the visit/encounter including Diagnostic results, Risks and benefits of tx options, Instructions for management, Patient and family education, Impressions, Counseling / Coordination of care, Documenting in the medical record, Reviewing / ordering tests, medicine, procedures  , and Obtaining or reviewing history  .

## 2024-08-14 NOTE — PROGRESS NOTES
Review of Systems   Constitutional:  Negative for appetite change, fatigue and fever.   HENT: Negative.  Negative for hearing loss, tinnitus, trouble swallowing and voice change.    Eyes: Negative.  Negative for photophobia, pain and visual disturbance.   Respiratory: Negative.  Negative for shortness of breath.    Cardiovascular: Negative.  Negative for palpitations.   Gastrointestinal: Negative.  Negative for nausea and vomiting.   Endocrine: Negative.  Negative for cold intolerance.   Genitourinary: Negative.  Negative for dysuria, frequency and urgency.   Musculoskeletal:  Negative for back pain, gait problem, myalgias, neck pain and neck stiffness.   Skin: Negative.  Negative for rash.   Allergic/Immunologic: Negative.    Neurological:  Positive for headaches. Negative for dizziness, tremors, seizures, syncope, facial asymmetry, speech difficulty, weakness, light-headedness and numbness.   Hematological: Negative.  Does not bruise/bleed easily.   Psychiatric/Behavioral: Negative.  Negative for confusion, hallucinations and sleep disturbance.      HPI: migraine f/u; Migraines are a lot better, way less, started taking magnesium and B12, a lot less stress.

## 2024-08-15 ENCOUNTER — OFFICE VISIT (OUTPATIENT)
Dept: PHYSICAL THERAPY | Facility: CLINIC | Age: 59
End: 2024-08-15
Payer: COMMERCIAL

## 2024-08-15 DIAGNOSIS — M54.42 ACUTE LEFT-SIDED LOW BACK PAIN WITH LEFT-SIDED SCIATICA: Primary | ICD-10-CM

## 2024-08-15 PROCEDURE — 97110 THERAPEUTIC EXERCISES: CPT | Performed by: PHYSICAL THERAPIST

## 2024-08-15 PROCEDURE — 97112 NEUROMUSCULAR REEDUCATION: CPT | Performed by: PHYSICAL THERAPIST

## 2024-08-15 NOTE — PROGRESS NOTES
"Daily Note     Today's date: 8/15/2024  Patient name: Aziza Armstrong  : 1965  MRN: [id number]  Referring provider: Anish Price DO  Dx:   Encounter Diagnosis     ICD-10-CM    1. Acute left-sided low back pain with left-sided sciatica  M54.42                      Subjective: Pt reports she's been having pain again for the last few days down into the left knee.       Objective: See treatment diary below      Assessment: Tolerated treatment well. Patient exhibited good technique with therapeutic exercises and would benefit from continued PT. LUE numbness/tingling felt with repeated cervical extension, better with neutral positioning during lumbar extensions.       Plan: Continue per plan of care.      Precautions: hypothyroidism      Manuals 7/25 7/30 8/1 8/12 8/15        L piriformis release KT            EPAT L piriformis D20T   Barr #3                                     Neuro Re-Ed             Bike             TA             TA + bridge w/ ball sq 5\"x20 5\"x20 5\"x20 5\"x20 5\"x20        TA+ bent knee fall out 20x 20x 20x 20x 20x        TA+kickouts             Prone prop Legs elevated 3' Legs elevated 3' Legs elevated 3' Legs elevated 5' Legs elevated 5'        Prone prop with glute sq             Prone alt UE/LE 20x ea 20x ea 20x ea 20x 20x        TA + donkey kick 20x ea 20x ea 20x ea 20x ea 20x ea        TA  + press out 20x ea blue 20x ea blue 20x ea blue 20x ea blue 20x ea blue        TA + glute sq w/ step up F/L             Pt ed             Ther Ex             REIL 10x    10x at wall        Piriformis str 30\"x3 30\"x3 30\"x3 30\"x3 30\"x3        TA + treadmill 10' 10' 10'  2.5 mph 10' 10'        TA + rev, lat slider lunge if able 2x10 ea 2x10 ea 2x10 ea 2x10 ea 2x10 ea        Self piriformis release with foam roller                                                    Ther Activity                                       Gait Training                                       Modalities                        "

## 2024-08-27 ENCOUNTER — OFFICE VISIT (OUTPATIENT)
Dept: PHYSICAL THERAPY | Facility: CLINIC | Age: 59
End: 2024-08-27
Payer: COMMERCIAL

## 2024-08-27 DIAGNOSIS — M54.42 ACUTE LEFT-SIDED LOW BACK PAIN WITH LEFT-SIDED SCIATICA: Primary | ICD-10-CM

## 2024-08-27 PROCEDURE — 97110 THERAPEUTIC EXERCISES: CPT | Performed by: PHYSICAL THERAPIST

## 2024-08-27 PROCEDURE — 97112 NEUROMUSCULAR REEDUCATION: CPT | Performed by: PHYSICAL THERAPIST

## 2024-08-27 NOTE — PROGRESS NOTES
"Daily Note     Today's date: 2024  Patient name: Aziza Armstrong  : 1965  MRN: [id number]  Referring provider: Anish Price DO  Dx:   Encounter Diagnosis     ICD-10-CM    1. Acute left-sided low back pain with left-sided sciatica  M54.42                      Subjective: Pt reports she's feeling well, better than at last visit.       Objective: See treatment diary below      Assessment: Tolerated treatment well. Patient exhibited good technique with therapeutic exercises and would benefit from continued PT      Plan: Continue per plan of care.      Precautions: hypothyroidism      Manuals 7/25 7/30 8/1 8/12 8/15 8/27       L piriformis release KT            EPAT L piriformis D20T   Barr #3                                     Neuro Re-Ed             Bike             TA             TA + bridge w/ ball sq 5\"x20 5\"x20 5\"x20 5\"x20 5\"x20 5\"x20       TA+ bent knee fall out 20x 20x 20x 20x 20x 20x       TA+kickouts             Prone prop Legs elevated 3' Legs elevated 3' Legs elevated 3' Legs elevated 5' Legs elevated 5' Legs elevated 5'       Prone prop with glute sq             Prone alt UE/LE 20x ea 20x ea 20x ea 20x 20x 20x       TA + donkey kick 20x ea 20x ea 20x ea 20x ea 20x ea 20x ea       TA  + press out 20x ea blue 20x ea blue 20x ea blue 20x ea blue 20x ea blue 20x ea blue       TA + glute sq w/ step up F/L             Pt ed             Ther Ex             REIL 10x    10x at wall        Piriformis str 30\"x3 30\"x3 30\"x3 30\"x3 30\"x3        TA + treadmill 10' 10' 10'  2.5 mph 10' 10' 10'       TA + rev, lat slider lunge if able 2x10 ea 2x10 ea 2x10 ea 2x10 ea 2x10 ea 2x10 ea 5#       Self piriformis release with foam roller                                                    Ther Activity                                       Gait Training                                       Modalities                                                        "

## 2024-09-03 ENCOUNTER — OFFICE VISIT (OUTPATIENT)
Dept: PHYSICAL THERAPY | Facility: CLINIC | Age: 59
End: 2024-09-03
Payer: COMMERCIAL

## 2024-09-03 DIAGNOSIS — M54.42 ACUTE LEFT-SIDED LOW BACK PAIN WITH LEFT-SIDED SCIATICA: Primary | ICD-10-CM

## 2024-09-03 PROCEDURE — 97110 THERAPEUTIC EXERCISES: CPT | Performed by: PHYSICAL THERAPIST

## 2024-09-03 PROCEDURE — 97112 NEUROMUSCULAR REEDUCATION: CPT | Performed by: PHYSICAL THERAPIST

## 2024-09-03 NOTE — PROGRESS NOTES
"Daily Note     Today's date: 9/3/2024  Patient name: Aziza Armstrong  : 1965  MRN: [id number]  Referring provider: Anish Price DO  Dx:   Encounter Diagnosis     ICD-10-CM    1. Acute left-sided low back pain with left-sided sciatica  M54.42                      Subjective: Pt had been doing well up until the last two days; possibly due to wearing flip flops or doing light yard work.       Objective: See treatment diary below      Assessment: Tolerated treatment well. Patient  with excellent recall of program. Consistently reduced RLE pain with prone extension.       Plan: Potential discharge next visit.     Precautions: hypothyroidism      Manuals 7/25 7/30 8/1 8/12 8/15 8/27 9/3      L piriformis release KT            EPAT L piriformis D20T   Barr #3                                     Neuro Re-Ed             Bike             TA             TA + bridge w/ ball sq 5\"x20 5\"x20 5\"x20 5\"x20 5\"x20 5\"x20 5\"x20      TA+ bent knee fall out 20x 20x 20x 20x 20x 20x 20x      TA+kickouts             Prone prop Legs elevated 3' Legs elevated 3' Legs elevated 3' Legs elevated 5' Legs elevated 5' Legs elevated 5' Legs elevated 5'      Prone prop with glute sq             Prone alt UE/LE 20x ea 20x ea 20x ea 20x 20x 20x 20x      TA + donkey kick 20x ea 20x ea 20x ea 20x ea 20x ea 20x ea 20x ea      TA  + press out 20x ea blue 20x ea blue 20x ea blue 20x ea blue 20x ea blue 20x ea blue 20x ea blue      TA + glute sq w/ step up F/L             Pt ed             Ther Ex             REIL 10x    10x at wall        Piriformis str 30\"x3 30\"x3 30\"x3 30\"x3 30\"x3        TA + treadmill 10' 10' 10'  2.5 mph 10' 10' 10' 10'      TA + rev, lat slider lunge if able 2x10 ea 2x10 ea 2x10 ea 2x10 ea 2x10 ea 2x10 ea 5# 2x10 ea 5#      Self piriformis release with foam roller                                                    Ther Activity                                       Gait Training                                     "   Modalities

## 2024-09-17 ENCOUNTER — OFFICE VISIT (OUTPATIENT)
Dept: PHYSICAL THERAPY | Facility: CLINIC | Age: 59
End: 2024-09-17
Payer: COMMERCIAL

## 2024-09-17 DIAGNOSIS — M54.42 ACUTE LEFT-SIDED LOW BACK PAIN WITH LEFT-SIDED SCIATICA: Primary | ICD-10-CM

## 2024-09-17 PROCEDURE — 97112 NEUROMUSCULAR REEDUCATION: CPT

## 2024-09-17 PROCEDURE — 97110 THERAPEUTIC EXERCISES: CPT

## 2024-09-17 NOTE — PROGRESS NOTES
"Daily Note     Today's date: 2024  Patient name: Aziza Amrstrong  : 1965  MRN: [id number]  Referring provider: Anish Price DO  Dx:   Encounter Diagnosis     ICD-10-CM    1. Acute left-sided low back pain with left-sided sciatica  M54.42           Start Time: 1615  Stop Time: 1653  Total time in clinic (min): 38 minutes    Subjective: Patient states inconsistency of radicular symptoms. She reports having an upcoming appt for possible MRI regarding these symptoms.       Objective: See treatment diary below      Assessment: Patient tolerated treatment session well. Patient demonstrated excellent technique, form, and recall of current POC. Symptoms were well managed t/o duration of visit and fatigues appropriately with reps. Patient and primary therapist agreeable to make today last treatment session as symptoms have minimally improved. Informed patient to call clinic if symptoms worsen, patient verbalized understanding.       Plan: Patient discharged following today's visit.      Precautions: hypothyroidism      Manuals 7/25 7/30 8/1 8/12 8/15 8/27 9/3 9/17     L piriformis release KT            EPAT L piriformis D20T   Barr #3                                     Neuro Re-Ed             Bike             TA             TA + bridge w/ ball sq 5\"x20 5\"x20 5\"x20 5\"x20 5\"x20 5\"x20 5\"x20 5\"x20     TA+ bent knee fall out 20x 20x 20x 20x 20x 20x 20x 20x     TA+kickouts             Prone prop Legs elevated 3' Legs elevated 3' Legs elevated 3' Legs elevated 5' Legs elevated 5' Legs elevated 5' Legs elevated 5' Legs elevated 5'     Prone prop with glute sq             Prone alt UE/LE 20x ea 20x ea 20x ea 20x 20x 20x 20x 20x     TA + donkey kick 20x ea 20x ea 20x ea 20x ea 20x ea 20x ea 20x ea 20x ea     TA  + press out 20x ea blue 20x ea blue 20x ea blue 20x ea blue 20x ea blue 20x ea blue 20x ea blue 20x ea blue     TA + glute sq w/ step up F/L             Pt ed             Ther Ex             REIL 10x    10x " "at wall        Piriformis str 30\"x3 30\"x3 30\"x3 30\"x3 30\"x3        TA + treadmill 10' 10' 10'  2.5 mph 10' 10' 10' 10' 10'     TA + rev, lat slider lunge if able 2x10 ea 2x10 ea 2x10 ea 2x10 ea 2x10 ea 2x10 ea 5# 2x10 ea 5# 2x10 ea 5#     Self piriformis release with foam roller                                                    Ther Activity                                       Gait Training                                       Modalities                                                            "

## 2025-03-26 ENCOUNTER — HOSPITAL ENCOUNTER (OUTPATIENT)
Dept: RADIOLOGY | Facility: IMAGING CENTER | Age: 60
Discharge: HOME | End: 2025-03-26
Payer: COMMERCIAL

## 2025-03-26 VITALS — WEIGHT: 185 LBS | HEIGHT: 65 IN | BODY MASS INDEX: 30.82 KG/M2

## 2025-03-26 DIAGNOSIS — Z12.31 ENCOUNTER FOR SCREENING MAMMOGRAM FOR MALIGNANT NEOPLASM OF BREAST: ICD-10-CM

## 2025-03-26 PROCEDURE — 77063 BREAST TOMOSYNTHESIS BI: CPT

## 2025-03-26 PROCEDURE — 77067 SCR MAMMO BI INCL CAD: CPT
